# Patient Record
Sex: FEMALE | Race: WHITE | NOT HISPANIC OR LATINO | Employment: FULL TIME | URBAN - METROPOLITAN AREA
[De-identification: names, ages, dates, MRNs, and addresses within clinical notes are randomized per-mention and may not be internally consistent; named-entity substitution may affect disease eponyms.]

---

## 2017-05-17 ENCOUNTER — COMMUNICATION - HEALTHEAST (OUTPATIENT)
Dept: FAMILY MEDICINE | Facility: CLINIC | Age: 47
End: 2017-05-17

## 2017-05-17 ENCOUNTER — AMBULATORY - HEALTHEAST (OUTPATIENT)
Dept: FAMILY MEDICINE | Facility: CLINIC | Age: 47
End: 2017-05-17

## 2017-05-17 DIAGNOSIS — I47.20 V TACH (H): ICD-10-CM

## 2017-06-15 ENCOUNTER — COMMUNICATION - HEALTHEAST (OUTPATIENT)
Dept: FAMILY MEDICINE | Facility: CLINIC | Age: 47
End: 2017-06-15

## 2017-06-20 ENCOUNTER — OFFICE VISIT - HEALTHEAST (OUTPATIENT)
Dept: FAMILY MEDICINE | Facility: CLINIC | Age: 47
End: 2017-06-20

## 2017-06-20 DIAGNOSIS — R05.9 COUGH: ICD-10-CM

## 2017-06-20 RX ORDER — ALBUTEROL SULFATE 90 UG/1
1 AEROSOL, METERED RESPIRATORY (INHALATION) EVERY 4 HOURS PRN
Qty: 1 EACH | Refills: 0 | Status: SHIPPED | OUTPATIENT
Start: 2017-06-20 | End: 2022-04-11

## 2017-07-03 ENCOUNTER — COMMUNICATION - HEALTHEAST (OUTPATIENT)
Dept: CARDIOLOGY | Facility: CLINIC | Age: 47
End: 2017-07-03

## 2017-07-03 DIAGNOSIS — I49.3 PVC (PREMATURE VENTRICULAR CONTRACTION): ICD-10-CM

## 2017-07-10 ENCOUNTER — OFFICE VISIT - HEALTHEAST (OUTPATIENT)
Dept: CARDIOLOGY | Facility: CLINIC | Age: 47
End: 2017-07-10

## 2017-07-10 DIAGNOSIS — I49.3 PVC (PREMATURE VENTRICULAR CONTRACTION): ICD-10-CM

## 2017-07-10 DIAGNOSIS — I47.29 PAROXYSMAL VENTRICULAR TACHYCARDIA (H): ICD-10-CM

## 2017-07-10 ASSESSMENT — MIFFLIN-ST. JEOR: SCORE: 1597.92

## 2017-07-26 ENCOUNTER — HOSPITAL ENCOUNTER (OUTPATIENT)
Dept: CARDIOLOGY | Facility: CLINIC | Age: 47
Discharge: HOME | End: 2017-07-26
Attending: INTERNAL MEDICINE

## 2017-09-18 ENCOUNTER — COMMUNICATION - HEALTHEAST (OUTPATIENT)
Dept: CARDIOLOGY | Facility: CLINIC | Age: 47
End: 2017-09-18

## 2017-09-18 DIAGNOSIS — I49.3 PVC (PREMATURE VENTRICULAR CONTRACTION): ICD-10-CM

## 2018-03-02 ENCOUNTER — COMMUNICATION - HEALTHEAST (OUTPATIENT)
Dept: CARDIOLOGY | Facility: CLINIC | Age: 48
End: 2018-03-02

## 2018-03-02 DIAGNOSIS — I49.3 PVC (PREMATURE VENTRICULAR CONTRACTION): ICD-10-CM

## 2018-06-05 ENCOUNTER — OFFICE VISIT - HEALTHEAST (OUTPATIENT)
Dept: FAMILY MEDICINE | Facility: CLINIC | Age: 48
End: 2018-06-05

## 2018-06-05 DIAGNOSIS — Z12.31 VISIT FOR SCREENING MAMMOGRAM: ICD-10-CM

## 2018-06-05 DIAGNOSIS — L91.8 CUTANEOUS SKIN TAGS: ICD-10-CM

## 2018-06-05 DIAGNOSIS — I47.29 PAROXYSMAL VENTRICULAR TACHYCARDIA (H): ICD-10-CM

## 2018-06-05 ASSESSMENT — MIFFLIN-ST. JEOR: SCORE: 1602.45

## 2018-07-03 ENCOUNTER — COMMUNICATION - HEALTHEAST (OUTPATIENT)
Dept: ADMINISTRATIVE | Facility: CLINIC | Age: 48
End: 2018-07-03

## 2018-07-23 ENCOUNTER — COMMUNICATION - HEALTHEAST (OUTPATIENT)
Dept: TELEHEALTH | Facility: CLINIC | Age: 48
End: 2018-07-23

## 2018-07-23 ENCOUNTER — COMMUNICATION - HEALTHEAST (OUTPATIENT)
Dept: FAMILY MEDICINE | Facility: CLINIC | Age: 48
End: 2018-07-23

## 2018-07-23 ENCOUNTER — HOSPITAL ENCOUNTER (OUTPATIENT)
Dept: MAMMOGRAPHY | Facility: CLINIC | Age: 48
Discharge: HOME | End: 2018-07-23
Attending: FAMILY MEDICINE

## 2018-07-23 DIAGNOSIS — Z12.31 VISIT FOR SCREENING MAMMOGRAM: ICD-10-CM

## 2018-08-25 ENCOUNTER — COMMUNICATION - HEALTHEAST (OUTPATIENT)
Dept: CARDIOLOGY | Facility: CLINIC | Age: 48
End: 2018-08-25

## 2018-08-25 DIAGNOSIS — I49.3 PVC (PREMATURE VENTRICULAR CONTRACTION): ICD-10-CM

## 2018-08-27 ENCOUNTER — OFFICE VISIT - HEALTHEAST (OUTPATIENT)
Dept: CARDIOLOGY | Facility: CLINIC | Age: 48
End: 2018-08-27

## 2018-08-27 DIAGNOSIS — I49.3 PVC (PREMATURE VENTRICULAR CONTRACTION): ICD-10-CM

## 2018-08-27 DIAGNOSIS — I47.29 PAROXYSMAL VENTRICULAR TACHYCARDIA (H): ICD-10-CM

## 2019-01-29 ENCOUNTER — COMMUNICATION - HEALTHEAST (OUTPATIENT)
Dept: CARDIOLOGY | Facility: CLINIC | Age: 49
End: 2019-01-29

## 2019-01-29 DIAGNOSIS — I49.3 PVC (PREMATURE VENTRICULAR CONTRACTION): ICD-10-CM

## 2019-05-28 ENCOUNTER — COMMUNICATION - HEALTHEAST (OUTPATIENT)
Dept: TELEHEALTH | Facility: CLINIC | Age: 49
End: 2019-05-28

## 2019-05-28 ENCOUNTER — OFFICE VISIT - HEALTHEAST (OUTPATIENT)
Dept: FAMILY MEDICINE | Facility: CLINIC | Age: 49
End: 2019-05-28

## 2019-05-28 ENCOUNTER — COMMUNICATION - HEALTHEAST (OUTPATIENT)
Dept: FAMILY MEDICINE | Facility: CLINIC | Age: 49
End: 2019-05-28

## 2019-05-28 DIAGNOSIS — R05.9 COUGH: ICD-10-CM

## 2019-05-28 RX ORDER — CODEINE PHOSPHATE AND GUAIFENESIN 10; 100 MG/5ML; MG/5ML
10 SOLUTION ORAL 4 TIMES DAILY PRN
Qty: 240 ML | Refills: 0 | Status: SHIPPED | OUTPATIENT
Start: 2019-05-28 | End: 2022-04-11

## 2019-10-07 ENCOUNTER — COMMUNICATION - HEALTHEAST (OUTPATIENT)
Dept: CARDIOLOGY | Facility: CLINIC | Age: 49
End: 2019-10-07

## 2019-10-07 DIAGNOSIS — I49.3 PVC (PREMATURE VENTRICULAR CONTRACTION): ICD-10-CM

## 2019-10-21 ENCOUNTER — COMMUNICATION - HEALTHEAST (OUTPATIENT)
Dept: FAMILY MEDICINE | Facility: CLINIC | Age: 49
End: 2019-10-21

## 2019-10-22 ENCOUNTER — AMBULATORY - HEALTHEAST (OUTPATIENT)
Dept: FAMILY MEDICINE | Facility: CLINIC | Age: 49
End: 2019-10-22

## 2019-10-22 DIAGNOSIS — I49.3 PVC (PREMATURE VENTRICULAR CONTRACTION): ICD-10-CM

## 2019-10-22 DIAGNOSIS — I47.29 PAROXYSMAL VENTRICULAR TACHYCARDIA (H): ICD-10-CM

## 2019-10-23 ENCOUNTER — OFFICE VISIT - HEALTHEAST (OUTPATIENT)
Dept: CARDIOLOGY | Facility: CLINIC | Age: 49
End: 2019-10-23

## 2019-10-23 DIAGNOSIS — I49.3 PVC (PREMATURE VENTRICULAR CONTRACTION): ICD-10-CM

## 2019-10-23 DIAGNOSIS — I47.29 PAROXYSMAL VENTRICULAR TACHYCARDIA (H): ICD-10-CM

## 2019-10-23 ASSESSMENT — MIFFLIN-ST. JEOR: SCORE: 1632.32

## 2020-01-03 ENCOUNTER — COMMUNICATION - HEALTHEAST (OUTPATIENT)
Dept: CARDIOLOGY | Facility: CLINIC | Age: 50
End: 2020-01-03

## 2020-01-03 DIAGNOSIS — I49.3 PVC (PREMATURE VENTRICULAR CONTRACTION): ICD-10-CM

## 2020-01-28 ENCOUNTER — COMMUNICATION - HEALTHEAST (OUTPATIENT)
Dept: FAMILY MEDICINE | Facility: CLINIC | Age: 50
End: 2020-01-28

## 2020-09-29 ENCOUNTER — COMMUNICATION - HEALTHEAST (OUTPATIENT)
Dept: FAMILY MEDICINE | Facility: CLINIC | Age: 50
End: 2020-09-29

## 2020-09-29 ENCOUNTER — AMBULATORY - HEALTHEAST (OUTPATIENT)
Dept: FAMILY MEDICINE | Facility: CLINIC | Age: 50
End: 2020-09-29

## 2020-09-29 DIAGNOSIS — I49.3 PVC (PREMATURE VENTRICULAR CONTRACTION): ICD-10-CM

## 2020-09-29 DIAGNOSIS — I47.29 PAROXYSMAL VENTRICULAR TACHYCARDIA (H): ICD-10-CM

## 2020-10-27 ENCOUNTER — COMMUNICATION - HEALTHEAST (OUTPATIENT)
Dept: CARDIOLOGY | Facility: CLINIC | Age: 50
End: 2020-10-27

## 2020-10-29 ENCOUNTER — OFFICE VISIT - HEALTHEAST (OUTPATIENT)
Dept: CARDIOLOGY | Facility: CLINIC | Age: 50
End: 2020-10-29

## 2020-10-29 DIAGNOSIS — R00.1 SINUS BRADYCARDIA: ICD-10-CM

## 2020-10-29 DIAGNOSIS — I47.29 PAROXYSMAL VENTRICULAR TACHYCARDIA (H): ICD-10-CM

## 2020-10-29 DIAGNOSIS — I49.3 PVC (PREMATURE VENTRICULAR CONTRACTION): ICD-10-CM

## 2020-10-29 LAB
ALBUMIN SERPL-MCNC: 4.3 G/DL (ref 3.5–5)
ALP SERPL-CCNC: 58 U/L (ref 45–120)
ALT SERPL W P-5'-P-CCNC: 20 U/L (ref 0–45)
ANION GAP SERPL CALCULATED.3IONS-SCNC: 13 MMOL/L (ref 5–18)
AST SERPL W P-5'-P-CCNC: 17 U/L (ref 0–40)
BILIRUB SERPL-MCNC: 0.8 MG/DL (ref 0–1)
BUN SERPL-MCNC: 13 MG/DL (ref 8–22)
CALCIUM SERPL-MCNC: 10 MG/DL (ref 8.5–10.5)
CHLORIDE BLD-SCNC: 104 MMOL/L (ref 98–107)
CO2 SERPL-SCNC: 24 MMOL/L (ref 22–31)
CREAT SERPL-MCNC: 0.67 MG/DL (ref 0.6–1.1)
ERYTHROCYTE [DISTWIDTH] IN BLOOD BY AUTOMATED COUNT: 12.8 % (ref 11–14.5)
GFR SERPL CREATININE-BSD FRML MDRD: >60 ML/MIN/1.73M2
GLUCOSE BLD-MCNC: 72 MG/DL (ref 70–125)
HCT VFR BLD AUTO: 47 % (ref 35–47)
HGB BLD-MCNC: 15.2 G/DL (ref 12–16)
MCH RBC QN AUTO: 28.9 PG (ref 27–34)
MCHC RBC AUTO-ENTMCNC: 32.3 G/DL (ref 32–36)
MCV RBC AUTO: 89 FL (ref 80–100)
PLATELET # BLD AUTO: 346 THOU/UL (ref 140–440)
PMV BLD AUTO: 11.2 FL (ref 8.5–12.5)
POTASSIUM BLD-SCNC: 4.6 MMOL/L (ref 3.5–5)
PROT SERPL-MCNC: 7.7 G/DL (ref 6–8)
RBC # BLD AUTO: 5.26 MILL/UL (ref 3.8–5.4)
SODIUM SERPL-SCNC: 141 MMOL/L (ref 136–145)
TSH SERPL DL<=0.005 MIU/L-ACNC: 1.09 UIU/ML (ref 0.3–5)
WBC: 8.5 THOU/UL (ref 4–11)

## 2020-10-29 RX ORDER — ERGOCALCIFEROL 1.25 MG/1
50000 CAPSULE ORAL
Status: SHIPPED | COMMUNITY
Start: 2020-10-29 | End: 2022-04-11

## 2020-10-29 RX ORDER — ASCORBIC ACID 500 MG
500 TABLET ORAL DAILY PRN
Status: SHIPPED | COMMUNITY
Start: 2020-10-29 | End: 2022-04-11

## 2020-10-29 ASSESSMENT — MIFFLIN-ST. JEOR: SCORE: 1368.85

## 2020-10-30 ENCOUNTER — COMMUNICATION - HEALTHEAST (OUTPATIENT)
Dept: CARDIOLOGY | Facility: CLINIC | Age: 50
End: 2020-10-30

## 2020-10-30 DIAGNOSIS — I49.3 PVC (PREMATURE VENTRICULAR CONTRACTION): ICD-10-CM

## 2020-10-30 RX ORDER — METOPROLOL TARTRATE 25 MG/1
25 TABLET, FILM COATED ORAL 2 TIMES DAILY
Qty: 180 TABLET | Refills: 3 | Status: SHIPPED | OUTPATIENT
Start: 2020-10-30 | End: 2021-11-15

## 2021-05-29 NOTE — PROGRESS NOTES
ASSESSMENT:  1. Cough  Chest x-ray negative for pneumonia, suspect viral bronchitis.  - XR Chest 2 Views  - codeine-guaiFENesin (GUAIFENESIN AC)  mg/5 mL liquid; Take 10 mL by mouth 4 (four) times a day as needed.  Dispense: 240 mL; Refill: 0        PLAN:  1.  Two-view chest x-ray discussed with the patient.  2.  Symptomatic treatment with codeine cough syrup, continue other symptomatic treatment.  3.  Follow-up as needed.       Orders Placed This Encounter   Procedures     XR Chest 2 Views     Order Specific Question:   Reason for Exam (Describe Symptoms):     Answer:   Cough for 1 week.     Order Specific Question:   Can the procedure be changed per Radiologist protocol?     Answer:   Yes     Order Specific Question:   Is the patient pregnant?     Answer:   No     There are no discontinued medications.    Return in about 2 weeks (around 6/11/2019) for Return to clinic if symptoms persist or worsen.    CHIEF COMPLAINT:  Chief Complaint   Patient presents with     Cough     1 week that seems to be getting worse        SUBJECTIVE:  An is a 49 y.o. female who presents with a cough which began a week ago. Patient explains that she initially had a sore throat which resolved after a day then she developed a cough and some wheezing. She has been experiencing some shortness of breath when going up the stairs. She also has a mild runny nose. She notes that her cough keeps her up at night. She has tried several OTC cough medications which have provided minimal relief. She has a history of exercise induced asthma but has not used an inhaler in years. She tried using her daughters inhaler last night which provided some relief. Her daughter was sick before her but is now feeling better.    REVIEW OF SYSTEMS:   Patient complains of a cough.  All other systems are negative.    PFSH:  Immunization History   Administered Date(s) Administered     Hep A, historic 04/06/2011, 12/10/2012     Hep B, Adult 10/01/1992,  10/30/1992, 03/19/1993     Hep B, historic 10/01/1992, 10/30/1992, 03/19/1993     Influenza, Seasonal, Inj PF IIV3 10/17/2013     Influenza, inj, historic,unspecified 11/22/2005, 11/01/2007, 12/03/2008, 12/10/2012, 10/03/2014, 10/14/2015     Influenza,seasonal quad, PF, 36+MOS 10/28/2016, 10/16/2017     Influenza,seasonal, Inj IIV3 11/22/2005, 11/01/2007, 12/03/2008     Pneumo Polysac 23-V 01/01/1993     Td, Adult, Absorbed 01/01/1996, 04/22/2003     Td,adult,historic,unspecified 06/02/2011     Tdap 01/01/1996, 04/22/2003     Social History     Socioeconomic History     Marital status:      Spouse name: Not on file     Number of children: Not on file     Years of education: Not on file     Highest education level: Not on file   Occupational History     Not on file   Social Needs     Financial resource strain: Not on file     Food insecurity:     Worry: Not on file     Inability: Not on file     Transportation needs:     Medical: Not on file     Non-medical: Not on file   Tobacco Use     Smoking status: Never Smoker     Smokeless tobacco: Never Used   Substance and Sexual Activity     Alcohol use: Not on file     Drug use: Not on file     Sexual activity: Not on file   Lifestyle     Physical activity:     Days per week: Not on file     Minutes per session: Not on file     Stress: Not on file   Relationships     Social connections:     Talks on phone: Not on file     Gets together: Not on file     Attends [employer] service: Not on file     Active member of club or organization: Not on file     Attends meetings of clubs or organizations: Not on file     Relationship status: Not on file     Intimate partner violence:     Fear of current or ex partner: Not on file     Emotionally abused: Not on file     Physically abused: Not on file     Forced sexual activity: Not on file   Other Topics Concern     Not on file   Social History Narrative    Diet-        Exercise-     History reviewed. No pertinent past medical  history.  Family History   Problem Relation Age of Onset     Breast cancer Maternal Grandmother 80       MEDICATIONS:  Current Outpatient Medications   Medication Sig Dispense Refill     metoprolol tartrate (LOPRESSOR) 50 MG tablet TAKE 1 TABLET BY MOUTH TWICE DAILY 180 tablet 2     albuterol (PROAIR HFA;PROVENTIL HFA;VENTOLIN HFA) 90 mcg/actuation inhaler Inhale 1 puff every 4 (four) hours as needed for wheezing or shortness of breath (shortness of breath). 1 each 0     codeine-guaiFENesin (GUAIFENESIN AC)  mg/5 mL liquid Take 10 mL by mouth 4 (four) times a day as needed. 240 mL 0     No current facility-administered medications for this visit.        TOBACCO USE:  Social History     Tobacco Use   Smoking Status Never Smoker   Smokeless Tobacco Never Used       VITALS:  Vitals:    05/28/19 1047   BP: 143/86   Pulse: 77   Temp: 98.4  F (36.9  C)   TempSrc: Oral   SpO2: 98%   Weight: (!) 233 lb 12.8 oz (106.1 kg)     Wt Readings from Last 3 Encounters:   05/28/19 (!) 233 lb 12.8 oz (106.1 kg)   08/27/18 (!) 233 lb (105.7 kg)   06/05/18 (!) 235 lb (106.6 kg)       PHYSICAL EXAM:  Constitutional:   Reveals a healthy appearing female.  Vitals: per nursing notes.  HEENT:  Ears:  External canals, TMs clear.    Eyes:  EOMs full, PERRL.  Lungs: Clear to A&P without rales or wheezes.  Respiratory effort normal.  Cardiac:   Regular rate and rhythm, normal S1, S2, no murmur or gallop.  Musculoskeletal: No peripheral swelling.  Neuro:  Alert and oriented. Cranial nerves, motor, sensory exams are intact.  No gross focal deficits.  Psychiatric:  Memory intact, mood appropriate.    QUALITY MEASURES:      DATA REVIEWED:  Additional History from Old Records Summarized (2):   Decision to Obtain Records (1):   Radiology Tests Summarized or Ordered (1): Ordered CXR.  Labs Reviewed or Ordered (1):   Medicine Test Summarized or Ordered (1):   Independent Review of EKG, X-RAY, or RAPID STREP (2 each): Reviewed CXR: Preliminary  interpretation is negative for pneumonia.     The visit lasted a total of 12 minutes face to face with the patient. Over 50% of the time was spent counseling and educating the patient about her cough.    By signing my name below, I, Deon Vincent, attest that this documentation has been prepared under the direction and in the presence of Dr. Zeferino Acosta.  Electronic Signature: José Miguel Alegria. 5/28/2019 10:49 AM.    I, Dr. Acosta, personally performed the services described in this documentation. All medical record entries made by the scribe were at my direction and in my presence. I have reviewed the chart and discharge instructions (if applicable) and agree that the record reflects my personal performance and is accurate and complete.      Total data points: 3

## 2021-05-31 VITALS — BODY MASS INDEX: 45.94 KG/M2 | HEIGHT: 60 IN | WEIGHT: 234 LBS

## 2021-05-31 VITALS — BODY MASS INDEX: 45.88 KG/M2 | WEIGHT: 234.9 LBS

## 2021-06-01 VITALS — BODY MASS INDEX: 45.5 KG/M2 | WEIGHT: 233 LBS

## 2021-06-01 VITALS — BODY MASS INDEX: 46.13 KG/M2 | HEIGHT: 60 IN | WEIGHT: 235 LBS

## 2021-06-02 VITALS — WEIGHT: 233.8 LBS | BODY MASS INDEX: 45.66 KG/M2

## 2021-06-02 NOTE — PROGRESS NOTES
[facility] Heart Care Note  Assessment:  1. History of repetitive mono-formic ventricular tachycardia, felt to originate from the right ventricular outflow tract.    2. Rhythm seems very well-controlled on beta-blockers.  Seems to do better on metoprolol tartrate       Episodes of bradycardia with metoprolol succinate  3. No structural heart disease with previous comprehensive evaluation, including stress nuclear scan, CT heart scan and echocardiogram.   Normal echocardiogram 5 May 2016      Plan:    You have done very well over the past year having almost no palpitations or awareness of arrhythmias  Continue metoprolol 50 mg twice daily  Watch blood pressure-you did have some elevated blood pressures today but repeat blood pressure was satisfactory  Remain active  I do not recommend any further cardiac studies at this time  You are due for a complete physical exam should check with your primary physician  When I reviewed the chart I see that you have not had any blood testing for quite some time and I do in that regard as well  Return in 1 year or sooner as need arises          Subjective:    I had the opportunity to see.An WHIPPLE Raul , who is a 49 y.o. female with a known history of ventricular tachycardia  She seems to do very well while taking metoprolol 50 mg twice daily.  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 Manuel at [facility] in the near future  Otherwise, An has been doing well over the past year with no other medical issues  She tries to stay active  Has  been unsuccessful  in weight management or weight loss        Problem List:  Patient Active Problem List   Diagnosis     Ventricular Tachycardia     PVC (premature ventricular contraction)     Medical History:  No past medical history on file.  Surgical History:  Past Surgical History:   Procedure Laterality Date      SECTION      Description:  Section;  Recorded: 2009;  Comments: failure to progress     Social History:  Social History     Socioeconomic History     Marital status:      Spouse name: Not on file     Number of children: Not on file     Years of education: Not on file     Highest education level: Not on file   Occupational History     Not on file   Social Needs     Financial resource strain: Not on file     Food insecurity:     Worry: Not on file     Inability: Not on file     Transportation needs:     Medical: Not on file     Non-medical: Not on file   Tobacco Use     Smoking status: Never Smoker     Smokeless tobacco: Never Used   Substance and Sexual Activity     Alcohol use: Not on file     Drug use: Not on file     Sexual activity: Not on file   Lifestyle     Physical activity:     Days per week: Not on file     Minutes per session: Not on file     Stress: Not on file   Relationships     Social connections:     Talks on phone: Not on file     Gets together: Not on file     Attends Jehovah's witness service: Not on file     Active member of club or organization: Not on file     Attends meetings of clubs or organizations: Not on file     Relationship status: Not on file     Intimate partner violence:     Fear of current or ex partner: Not on file     Emotionally abused: Not on file     Physically abused: Not on file     Forced sexual activity: Not on file   Other Topics Concern     Not on file   Social History Narrative    Diet-        Exercise-       Review of Systems:      General: WNL  Eyes: WNL  Ears/Nose/Throat: WNL  Lungs: WNL  Heart: WNL  Stomach: WNL  Bladder: WNL  Muscle/Joints: WNL  Skin:  "WNL  Nervous System: WNL  Mental Health: WNL     Blood: WNL        Family History:  Family History   Problem Relation Age of Onset     Breast cancer Maternal Grandmother 80         Allergies:  Allergies   Allergen Reactions     Amoxicillin Nausea And Vomiting     Metoprolol Succinate Other (See Comments)     Pt felt bad, bradycardic and sick with the long acting metoprolol.     Mold Other (See Comments)     Sinus infections, runny nose.     Erythromycin Nausea And Vomiting       Medications:  Current Outpatient Medications   Medication Sig Dispense Refill     metoprolol tartrate (LOPRESSOR) 50 MG tablet TAKE 1 TABLET BY MOUTH TWICE DAILY 180 tablet 0     albuterol (PROAIR HFA;PROVENTIL HFA;VENTOLIN HFA) 90 mcg/actuation inhaler Inhale 1 puff every 4 (four) hours as needed for wheezing or shortness of breath (shortness of breath). 1 each 0     codeine-guaiFENesin (GUAIFENESIN AC)  mg/5 mL liquid Take 10 mL by mouth 4 (four) times a day as needed. 240 mL 0     No current facility-administered medications for this visit.          Objective:   Vital signs:  /76 (Patient Site: Left Arm, Patient Position: Sitting, Cuff Size: Adult Large)   Pulse 68   Resp 16   Ht 5' 1.02\" (1.55 m)   Wt (!) 238 lb (108 kg)   BMI 44.93 kg/m        Physical Exam:  Alert pleasant articulate in no acute distress    GENERAL APPEARANCE: Alert, cooperative and in no acute distress.  HEENT: No scleral icterus. No Xanthelasma. Oral mucous membranes pink and moist.  NECK: JVP  Difficult to assesscm. No Hepatojugular reflux. Thyroid not  Palpable  CHEST: clear to auscultation and percussion  CARDIOVASCULAR: S1, S2 without murmur    Brachial, radial  pulses are intact and symmetric.   No carotid bruits noted.  ABDOMEN: Non tender. BS+. No bruits.  EXTREMITIES: No cyanosis, clubbing or edema.    Lab Results:  LIPIDS:  Lab Results   Component Value Date    CHOL 169 10/31/2011     Lab Results   Component Value Date    HDL 30 (L) " 10/31/2011     Lab Results   Component Value Date    LDLCALC 89.6 10/31/2011     Lab Results   Component Value Date    TRIG 247 (H) 10/31/2011     No components found for: CHOLHDL    BMP:  Lab Results   Component Value Date    CREATININE 0.7 10/31/2011    BUN 10 10/31/2011     10/31/2011    K 4.3 10/31/2011     10/31/2011    CO2 24 10/31/2011         This note has been dictated using voice recognition software. Any grammatical or context distortions are unintentional and inherent to the software.  Luis Garnica MD  [facility] HEART [facility]  [phone]

## 2021-06-02 NOTE — PATIENT INSTRUCTIONS - HE
An Carter    Thank you for coming to the [facility] today for a cardiac evaluation  It was my pleasure to see you today  A good contact for any questions would be Whitley Franklin  RN @ [phone]    You have done very well over the past year having almost no palpitations or awareness of arrhythmias  Continue metoprolol 50 mg twice daily  Watch blood pressure-you did have some elevated blood pressures today but repeat blood pressure was satisfactory  Remain active  I do not recommend any further cardiac studies at this time  You are due for a complete physical exam should check with your primary physician  When I reviewed the chart I see that you have not had any blood testing for quite some time and I do in that regard as well  Return in 1 year or sooner as need arises

## 2021-06-02 NOTE — TELEPHONE ENCOUNTER
Referral Request  Type of referral: Heart care   Who s requesting: Patient  Why the request: Scheduled for F/U and needs referral for insurance reasons  Have you been seen for this request: Yes  Does patient have a preference on a group/provider? Dr. Danika CARRINGTON [facility]  Okay to leave a detailed message?  Yes

## 2021-06-03 VITALS
HEART RATE: 68 BPM | BODY MASS INDEX: 44.93 KG/M2 | WEIGHT: 238 LBS | HEIGHT: 61 IN | DIASTOLIC BLOOD PRESSURE: 76 MMHG | SYSTOLIC BLOOD PRESSURE: 128 MMHG | RESPIRATION RATE: 16 BRPM

## 2021-06-11 NOTE — PROGRESS NOTES
[facility] Heart Care Note    IMPRESSION:    1. History of repetitive mono-formic ventricular tachycardia, felt to originate from the right ventricular outflow tract.    2. Rhythm seems very well-controlled on beta-blockers.  Seems to do better on metoprolol tartrate       Episodes of bradycardia with metoprolol succinate  3. No structural heart disease with previous comprehensive evaluation, including stress nuclear scan, CT heart scan and echocardiogram.   Normal echocardiogram 5 May 2016      Plan:  You seem to be doing much better with your job change and reduced stress levels  Continue metoprolol tartrate 50 mg twice daily  Obtain 24 hour Holter monitor to assess for ventricular ectopy and bradycardia  We  discussed the possibility of EP study and PVC ablation; with current monitoring system and with expertise of our physicians this has been quite successful  Anticipate seeing you in 1 year, return sooner and if any problems arise    Subjective:    An is changed her job.  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; she finds this job to be much less stressful  In her overall outlook has improved considerably  When she took metoprolol succinate 100 mg daily she had episodes of bradycardia felt her heart rate was 30 bpm  She went back to metoprolol tartrate 50 mg twice daily and has had no apparent bradycardia  She has very rare ectopy perhaps every month she will note some palpitations but if she misses a dose of metoprolol does seem that her heart rate can be come on even  She has no chest pain  Denies orthopnea PND or pedal edema  No syncope or near syncope  She has been quite active    We discussed the upgrades and advances in VT and PVC ablation and this would be an option and would allow stopping metoprolol.  We also discussed all metoprolol is a very safe medicine and would not cause organ toxicity and can be  taken for extended duration          Problem List:  Patient Active Problem List   Diagnosis     Pain In The Leg (Below The Knee)     Dyshidrotic Eczema     Ventricular Tachycardia     Vitamin D Deficiency     PVC (premature ventricular contraction)     Medical History:  No past medical history on file.  Surgical History:  Past Surgical History:   Procedure Laterality Date     PA  DELIVERY ONLY      Description:  Section;  Recorded: 2009;  Comments: failure to progress     Social History:  Social History     Social History     Marital status:      Spouse name: N/A     Number of children: N/A     Years of education: N/A     Occupational History     Not on file.     Social History Main Topics     Smoking status: Never Smoker     Smokeless tobacco: Never Used     Alcohol use Not on file     Drug use: Not on file     Sexual activity: Not on file     Other Topics Concern     Not on file     Social History Narrative       Review of Systems:        General: WNL  Eyes: WNL  Ears/Nose/Throat: WNL  Lungs: WNL  Heart: WNL  Stomach: WNL  Bladder: WNL  Muscle/Joints: WNL  Skin: WNL  Nervous System: WNL  Mental Health: WNL  Hormones: Negative  Blood: WNL                                            Family History:  Family History   Problem Relation Age of Onset     Breast cancer Maternal Grandmother 80         Allergies:  Allergies   Allergen Reactions     Amoxicillin Nausea And Vomiting     Metoprolol Succinate Other (See Comments)     Pt felt bad, bradycardic and sick with the long acting metoprolol.     Mold Other (See Comments)     Sinus infections, runny nose.       Medications:  Current Outpatient Prescriptions   Medication Sig Dispense Refill     metoprolol tartrate (LOPRESSOR) 50 MG tablet TAKE 1 TABLET BY MOUTH TWICE DAILY 180 tablet 0     albuterol (PROAIR HFA;PROVENTIL HFA;VENTOLIN HFA) 90 mcg/actuation inhaler Inhale 1 puff every 4 (four) hours as needed for wheezing or shortness of breath  (shortness of breath). 1 each 0     benzonatate (TESSALON PERLES) 100 MG capsule Take 1 capsule (100 mg total) by mouth 3 (three) times a day as needed for cough. 30 capsule 0     No current facility-administered medications for this visit.        Objective:   Vital signs:  /68 (Patient Site: Right Arm, Patient Position: Sitting, Cuff Size: Adult Large)  Resp 16  Ht 5' (1.524 m)  Wt (!) 234 lb (106.1 kg)  LMP 06/10/2017 (Approximate)  BMI 45.7 kg/m2      Physical Exam:  Patient is overweight at 234 pounds  Pressure 120/70 sitting, 128 standing  Heart rate is 70 and regular with no ectopics    GENERAL APPEARANCE: Alert, cooperative and in no acute distress.  HEENT: No scleral icterus. No Xanthelasma. Oral mucous membranes pink and moist.  NECK: JVP  Normal cm. No Hepatojugular reflux. Thyroid not  Palpable  CHEST: clear to auscultation and percussion  CARDIOVASCULAR: S1, S2 without murmur    Brachial, radial  pulses are intact and symmetric.   No carotid bruits noted.  ABDOMEN: Non tender. BS+. No bruits.  EXTREMITIES: No cyanosis, clubbing or edema.    Lab Results:  LIPIDS:  Lab Results   Component Value Date    CHOL 169 10/31/2011     Lab Results   Component Value Date    HDL 30 (L) 10/31/2011     Lab Results   Component Value Date    LDLCALC 89.6 10/31/2011     Lab Results   Component Value Date    TRIG 247 (H) 10/31/2011     No components found for: CHOLHDL    BMP:  Lab Results   Component Value Date    CREATININE 0.7 10/31/2011    BUN 10 10/31/2011     10/31/2011    K 4.3 10/31/2011     10/31/2011    CO2 24 10/31/2011         This note has been dictated using voice recognition software. Any grammatical or context distortions are unintentional and inherent to the software.  Luis Garnica MD  [facility] HEART [facility]  [phone]

## 2021-06-11 NOTE — PROGRESS NOTES
Assessment/Plan:        Diagnoses and all orders for this visit:    Cough        We will start her on doxycycline, Tessalon Perles.  We will also provide her with albuterol inhaler if the Tessalon Perles does not help with her coughing fits.  Fluid hydration, precautionary measures.  Probiotics or increase yogurt intake with the antibiotic.  Sunscreen protection with her upcoming trip, limit sun exposure.  Recheck in 1 and half weeks when she comes back from her trip if symptoms persistent.  Earlier evaluation if worse.  She was agreeable with the plans.  Subjective:    Patient ID: An Carter is a 47 y.o. female.    HPI    An is here with concerns about cough.  Symptoms has been ongoing for 13 days now.  She started with a sore throat, pain in her teeth, face.  She then developed cough the past 10 days.  Cough with sometimes be an fits to the point of vomiting, gagging.  Denies any hemoptysis.  No shortness of breath.  Headaches and sternal pain with coughing.  [place] [facility] [place] [facility] [employer]  Is not in direct contact with patients.  She denies any recent travel but will be traveling this weekend.  Denies any issues with smoking.  History of exercise-induced asthma and the last episode was almost 15 years ago.  She has been taking Tylenol, Advil.  She also tried Robitussin-DM, Mucinex DM.  Congestion and pain is better but the cough has been persistent and is slightly worse compared to before.    She has V. tach.  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  She is currently on metoprolol tartrate 50 mg twice a day.  They tried her on metoprolol succinate 100 mg daily but cause more bradycardia.  She has an upcoming follow-up with cardiology in July.  Visits them once a year.  She also has Lasix in place for intermittent leg swelling.  She has not been taking it as swelling has been minimal.    Review of Systems  As above otherwise negative.          Objective:     Physical Exam  /80 (Patient Site: Right Arm, Patient Position: Sitting, Cuff Size: Adult Large)  Pulse 89  Temp 98.3  F (36.8  C) (Oral)   Wt (!) 234 lb 14.4 oz (106.5 kg)  LMP 06/10/2017 (Approximate)  SpO2 98%  Breastfeeding? No  BMI 45.88 kg/m2    Vital signs noted above. AAO ×3.  HEENT no nasal discharge, moist oral mucosa. Ears:TMs intact, no fluid collection. Neck: Supple neck, nonpalpable cervical lymph nodes.  Lungs: Symmetrical chest expansion, no retractions.  Clear to auscultation bilateral.  Heart: S1-S2 regular rate and rhythm, no murmurs were noted.  Abdomen:  with bowel sounds.  Extremities: Minimal bipedal edema, pulses were full and equal.

## 2021-06-11 NOTE — TELEPHONE ENCOUNTER
Referral Request  Type of referral: Cardiology  Who s requesting: Patient  Why the request: Dysrhythmia  Have you been seen for this request: Yes  Does patient have a preference on a group/provider? Dr. Garnica @ [facility]  Okay to leave a detailed message?  Yes

## 2021-06-11 NOTE — TELEPHONE ENCOUNTER
Referral request, please advise.    Ins:  , Prior Authorization will need to be obtained prior to scheduling if required

## 2021-06-11 NOTE — TELEPHONE ENCOUNTER
I did place a referral for cardiology, but also she would be due for an office visit or a physical this fall.

## 2021-06-12 NOTE — TELEPHONE ENCOUNTER
Wellness Screening Tool  Symptom Screening:  Do you have one of the following NEW symptoms:    Fever (subjective or >100.0)?  No    A new cough?  No    Shortness of breath?  No     Chills? No     New loss of taste or smell? No     Generalized body aches? No     New persistent headache? No     New sore throat? No     Nausea, vomiting, or diarrhea?  No    Within the past 2 weeks, have you been exposed to someone with a known positive illness below:    COVID-19 (known or suspected)?  No    Chicken pox?  No    Mealses?  No    Pertussis?  No    Patient notified of visitor policy- They may have one person accompany them to their appointment, but they will need to wear a mask and will be screened upon arrival for symptoms: Yes  Pt informed to wear a mask: Yes  Pt notified if they develop any symptoms listed above, prior to their appointment, they are to call the clinic directly at [phone] for further instructions.  Yes  Patient's appointment status: 10/29

## 2021-06-12 NOTE — PROGRESS NOTES
[facility] Heart Care Note    Assessment:  1. History of repetitive mono-formic ventricular tachycardia, felt to originate from the right ventricular outflow tract.    2. Rhythm seems very well-controlled on beta-blockers.  Seems to do better on metoprolol tartrate       Episodes of bradycardia with metoprolol succinate  3. No structural heart disease with previous comprehensive evaluation, including stress nuclear scan, CT heart scan and echocardiogram.   Normal echocardiogram 5 May 2016    Plan:    You have lost 50 pounds on a conscientious weight loss program/ weight watchers  Continue  with your moderate exercise program  Rhythm seems well controlled-no apparent VT  Heart rate seems too slow so we should reduce the metoprolol tartrate to 25 mg twice daily  Blood work today will include a TSH, comprehensive metabolic panel and a CBC  Follow-up in 1 year or sooner as the need arises      Subjective:    I had the opportunity to see.An Caretr , who is a 50 y.o. female with a known history of VCs and ventricular tachycardia  While on metoprolol succinate her heart rate was too slow so she was switched to metoprolol tartrate 50 mg twice daily  Continues to notice slow heart rate sometimes in the 40s or 50s and sometimes she gets lightheaded upon standing  No awareness of palpitations no major arrhythmias no syncopal or near syncopal episodes  Exercise capacity is good not limited  Has lost 50 pounds on a conscientious diet uses weight watchers program she has a pretty realistic diet program  Currently works from home does not go into the hospital at all  Her daughter went off to college at  [employer]-did not find it be good experience lots of restrictions as of the Covid      Problem List:  Patient Active Problem List   Diagnosis     Ventricular Tachycardia     PVC (premature ventricular contraction)     Medical History:  No past medical history on file.  Surgical History:  Past Surgical History:   Procedure  Laterality Date      SECTION      Description:  Section;  Recorded: 2009;  Comments: failure to progress     Social History:  Social History     Socioeconomic History     Marital status:      Spouse name: Not on file     Number of children: Not on file     Years of education: Not on file     Highest education level: Not on file   Occupational History     Not on file   Social Needs     Financial resource strain: Not on file     Food insecurity     Worry: Not on file     Inability: Not on file     Transportation needs     Medical: Not on file     Non-medical: Not on file   Tobacco Use     Smoking status: Never Smoker     Smokeless tobacco: Never Used   Substance and Sexual Activity     Alcohol use: Not on file     Drug use: Not on file     Sexual activity: Not on file   Lifestyle     Physical activity     Days per week: Not on file     Minutes per session: Not on file     Stress: Not on file   Relationships     Social connections     Talks on phone: Not on file     Gets together: Not on file     Attends Restoration service: Not on file     Active member of club or organization: Not on file     Attends meetings of clubs or organizations: Not on file     Relationship status: Not on file     Intimate partner violence     Fear of current or ex partner: Not on file     Emotionally abused: Not on file     Physically abused: Not on file     Forced sexual activity: Not on file   Other Topics Concern     Not on file   Social History Narrative    Diet-        Exercise-       Review of Systems:      General: WNL  Eyes: WNL  Ears/Nose/Throat: WNL  Lungs: WNL  Heart: WNL  Stomach: WNL  Bladder: WNL  Muscle/Joints: WNL  Skin: WNL  Nervous System: WNL  Mental Health: WNL     Blood: WNL        Family History:  Family History   Problem Relation Age of Onset     Breast cancer Maternal Grandmother 80         Allergies:  Allergies   Allergen Reactions     Amoxicillin Nausea And Vomiting     Metoprolol Succinate  Other (See Comments)     Pt felt bad, bradycardic and sick with the long acting metoprolol.     Mold Other (See Comments)     Sinus infections, runny nose.     Erythromycin Nausea And Vomiting       Medications:  Current Outpatient Medications   Medication Sig Dispense Refill     albuterol (PROAIR HFA;PROVENTIL HFA;VENTOLIN HFA) 90 mcg/actuation inhaler Inhale 1 puff every 4 (four) hours as needed for wheezing or shortness of breath (shortness of breath). 1 each 0     apple cider vinegar 600 mg cap Take by mouth.       ascorbic acid, vitamin C, (ASCORBIC ACID WITH KATIE HIPS) 500 MG tablet Take 500 mg by mouth daily.       codeine-guaiFENesin (GUAIFENESIN AC)  mg/5 mL liquid Take 10 mL by mouth 4 (four) times a day as needed. 240 mL 0     ergocalciferol (VITAMIN D2) 1,250 mcg (50,000 unit) capsule Take 50,000 Units by mouth every 7 days. 1,000 units       metoprolol tartrate (LOPRESSOR) 50 MG tablet TAKE 1 TABLET BY MOUTH TWICE DAILY 180 tablet 3     multivit with minerals/lutein (MULTIVITAMIN 50 PLUS ORAL) Take by mouth.       No current facility-administered medications for this visit.       blood pressure 110/70 sitting, 120 standing  Heart rate 50 and regular without ectopy  Respiratory rate is nonlabored about 16  Patient seems afebrile   Vit      Physical Exam:  GENERAL APPEARANCE: Alert, cooperative and in no acute distress.  She has lost quite a bit of weight quite noticeable  HEENT: No scleral icterus. No Xanthelasma. Oral mucous membranes pink and moist.  NECK: JVP normal cm. No Hepatojugular reflux. Thyroid not  Palpable  CHEST: clear to auscultation and percussion  CARDIOVASCULAR: S1, S2 without murmur    Brachial, radial  pulses are intact and symmetric.   No carotid bruits noted.  ABDOMEN: Non tender. BS+. No bruits.  EXTREMITIES: No cyanosis, clubbing or edema.    Lab Results:  LIPIDS:  Lab Results   Component Value Date    CHOL 169 10/31/2011     Lab Results   Component Value Date    HDL 30 (L)  10/31/2011     Lab Results   Component Value Date    LDLCALC 89.6 10/31/2011     Lab Results   Component Value Date    TRIG 247 (H) 10/31/2011     No components found for: CHOLHDL    BMP:  Lab Results   Component Value Date    CREATININE 0.7 10/31/2011    BUN 10 10/31/2011     10/31/2011    K 4.3 10/31/2011     10/31/2011    CO2 24 10/31/2011         This note has been dictated using voice recognition software. Any grammatical or context distortions are unintentional and inherent to the software.  Luis Garnica MD  [facility] HEART [facility]  [phone]

## 2021-06-12 NOTE — PATIENT INSTRUCTIONS - HE
An Carter    Thank you for coming to the [facility] today for a cardiac evaluation  It was my pleasure to see you today  A good contact for any questions would be Whitley Franklin  RN @ [phone]    You have lost 50 pounds on a conscientious weight loss program/ weight watchers  Continue  with your moderate exercise program  Rhythm seems well controlled-no apparent VT  Heart rate seems too slow so we should reduce the metoprolol tartrate to 25 mg twice daily  Blood work today will include a TSH, comprehensive metabolic panel and a CBC  Follow-up in 1 year or sooner as the need arises

## 2021-06-16 PROBLEM — I49.3 PVC (PREMATURE VENTRICULAR CONTRACTION): Status: ACTIVE | Noted: 2017-07-10

## 2021-06-18 NOTE — PROGRESS NOTES
Procedures  Skin tags cleansed with topical alcohol  Removed using pick-up and Derma-blade  Bleeding controlled with gauze dressing  Covered with bandages

## 2021-06-18 NOTE — PROGRESS NOTES
ASSESSMENT:  1. Visit for screening mammogram  Patient is due for screening mammogram.  - Mammo Screening Bilateral; Future    2. Cutaneous skin tags  34 skin tags removed from the anterior neck.    3. Ventricular Tachycardia  Patient with a history of ventricular tachycardia followed by cardiology controlled with metoprolol.      PLAN:  1.  34 skin tags removed as described.  2.  Order for screening mammogram.  3.  Has yearly follow-up with cardiology.  4.  Patient will need to establish with a new primary and be seen for physical within the year.    No orders of the defined types were placed in this encounter.    There are no discontinued medications.    No Follow-up on file.    CHIEF COMPLAINT:  Chief Complaint   Patient presents with     Skin Problem     skin tag would like removed- [facility] , multiple skin tag on her neck -        SUBJECTIVE:  An is a 48 y.o. female presenting to the clinic today for skin tag removal.     Skin Tag: She has numerous skin tags in the face and neck area that she would like removed today. She notes that she has had several removed, most recently five or six years ago. Since then, many of them have grown back. They do not get caught on anything or cause her any real convenience, but would like the removed for aesthetic reasons.    Premature Ventricular Contractions: She is regularly followed by cardiology for a history of ventricular tachycardia and premature ventricular contractions. She takes a daily dose of metoprolol which has helped her maintain normal sinus rhythm. She notes that her cardiologist wanted to perform a procedure but she declined.     Health Maintenance: She is overdue for a mammogram.     REVIEW OF SYSTEMS:   All other systems are negative.    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  Immunization History   Administered Date(s) Administered     Hep A, historic 04/06/2011, 12/10/2012     Hep B, Adult 10/01/1992, 10/30/1992,  03/19/1993     Hep B, historic 10/01/1992, 10/30/1992, 03/19/1993     Influenza, Seasonal, Inj PF IIV3 10/17/2013     Influenza, inj, historic,unspecified 11/22/2005, 11/01/2007, 12/03/2008, 12/10/2012, 10/03/2014, 10/14/2015     Influenza,seasonal quad, PF, 36+MOS 10/28/2016, 10/16/2017     Influenza,seasonal, Inj IIV3 11/22/2005, 11/01/2007, 12/03/2008     Pneumo Polysac 23-V 01/01/1993     Td, Adult, Absorbed 01/01/1996, 04/22/2003     Td,adult,historic,unspecified 06/02/2011     Tdap 01/01/1996, 04/22/2003     Social History     Social History     Marital status:      Spouse name: N/A     Number of children: N/A     Years of education: N/A     Occupational History     Not on file.     Social History Main Topics     Smoking status: Never Smoker     Smokeless tobacco: Never Used     Alcohol use Not on file     Drug use: Not on file     Sexual activity: Not on file     Other Topics Concern     Not on file     Social History Narrative    Diet-        Exercise-     History reviewed. No pertinent past medical history.  Family History   Problem Relation Age of Onset     Breast cancer Maternal Grandmother 80       MEDICATIONS:  Current Outpatient Prescriptions   Medication Sig Dispense Refill     albuterol (PROAIR HFA;PROVENTIL HFA;VENTOLIN HFA) 90 mcg/actuation inhaler Inhale 1 puff every 4 (four) hours as needed for wheezing or shortness of breath (shortness of breath). 1 each 0     benzonatate (TESSALON PERLES) 100 MG capsule Take 1 capsule (100 mg total) by mouth 3 (three) times a day as needed for cough. 30 capsule 0     metoprolol tartrate (LOPRESSOR) 50 MG tablet TAKE 1 TABLET BY MOUTH TWICE DAILY 180 tablet 1     No current facility-administered medications for this visit.        TOBACCO USE:  History   Smoking Status     Never Smoker   Smokeless Tobacco     Never Used       VITALS:  Vitals:    06/05/18 1134   BP: 122/82   Pulse: 76   Weight: (!) 235 lb (106.6 kg)   Height: 5' (1.524 m)     Wt Readings  from Last 3 Encounters:   06/05/18 (!) 235 lb (106.6 kg)   07/10/17 (!) 234 lb (106.1 kg)   06/20/17 (!) 234 lb 14.4 oz (106.5 kg)       PHYSICAL EXAM:  Constitutional:   Reveals a pleasant female that appears stated age.  Vitals: per nursing notes.  Skin: Numerous skin tags generally one to two millimeters noted in anterior neck.   Neuro:  Alert and oriented. Cranial nerves, motor, sensory exams are intact.  No gross focal deficits.  Psychiatric:  Memory intact, mood appropriate.    DATA REVIEWED:  Additional History from Old Records Summarized (2): Reviewed cardiology 7/10/17; ventricular tachycardia, PVCs.   Decision to Obtain Records (1): None.   Radiology Tests Summarized or Ordered (1): Ordered mammogram.  Labs Reviewed or Ordered (1): None.   Medicine Test Summarized or Ordered (1): None.   Independent Review of EKG, X-RAY, or RAPID STREP (2 each): None.     The visit lasted a total of 20 minutes face to face with the patient. Over 50% of the time was spent counseling and educating the patient about skin tag removal.    I, Des Arana, am scribing for and in the presence of, Dr. Acosta.    I, Dr. Acosta, personally performed the services described in this documentation, as scribed by Des Arana in my presence, and it is both accurate and complete.    Total data points: 3

## 2021-06-19 NOTE — LETTER
Letter by Zeferino Acosta MD at      Author: Zeferino Acosta MD Service: -- Author Type: --    Filed:  Encounter Date: 5/28/2019 Status: (Other)         An Carter  [address] [place] [address]             May 28, 2019         Dear Ms. Carter,    Below are the results from your recent visit: The official radiology interpretation is chest x-ray is negative for pneumonia.    Resulted Orders   XR Chest 2 Views    Narrative    EXAM: XR CHEST 2 VIEWS  LOCATION: [facility]  DATE/TIME: 5/28/2019 11:02 AM    INDICATION: Cough for 1 week.  COMPARISON: None.    FINDINGS: Negative chest.            Please call with questions or contact us using [employer].    Sincerely,        Electronically signed by Zeferino Acosta MD

## 2021-06-20 NOTE — LETTER
Letter by Zeferino Acosta MD at      Author: Zeferino Acosta MD Service: -- Author Type: --    Filed:  Encounter Date: 1/28/2020 Status: (Other)             An Carter  [address] [place] [address]           Dear An Carter ,  Our records indicate that you are due for an annual physical and  cervical cancer screen/pap screen.  Please call the clinic or use my chart and schedule an office visit to complete this.  If you have had this done somewhere other than [facility], please help us obtain a copy of your testing/results so we can update your records.  You can also just let us know when and where you had the testing completed through a phone call or through my chart.The records can be faxed to us at [phone].  If you have not had this completed, below are numbers of clinics in the area that offer this along with our [facility] clinic.    [facility] [phone]    [facility] [phone]    [facility] [phone]

## 2021-06-20 NOTE — PROGRESS NOTES
[facility] Heart Care Note    Assessment:  1. History of repetitive mono-formic ventricular tachycardia, felt to originate from the right ventricular outflow tract.    2. Rhythm seems very well-controlled on beta-blockers.  Seems to do better on metoprolol tartrate       Episodes of bradycardia with metoprolol succinate  3. No structural heart disease with previous comprehensive evaluation, including stress nuclear scan, CT heart scan and echocardiogram.   Normal echocardiogram 5 May 2016    Plan:    Continue metoprolol tartrate 50 mg twice daily  I do not think any further cardiac studies were necessary at this time  And exercise more; with a new garner retriever, that should give you motivation to exercise  Return in 1 year for comprehensive cardiac and arrhythmia assessment      Subjective:    I had the opportunity to see.An WHIPPLE Raul , who is a 48 y.o. female with a known history of PVCs originating in the RV outflow tract  An has had a pretty rough year mainly because of family, social issues, her mother-in-law , they spent quite a bit of time dealing with her possessions etc.  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  Tries to walk but has not been very active; is thinking she will be more active as she starts to walk her new dog-a garner retriever  She has no awareness of palpitations tachydysrhythmia or ventricular tachycardia; no syncopal or near syncopal episodes  Denies excessive breathlessness no orthopnea PND or pedal edema  Prefers to take metoprolol tartrate does not seem as sluggish as when she takes the succinate preparation-also not as bradycardic   No angina  No other medical conditions  Continues with obesity, has had little success with diet        Problem List:  Patient Active Problem List   Diagnosis     Ventricular Tachycardia     PVC (premature ventricular contraction)     Medical History:  No past medical history on file.  Surgical History:  Past Surgical  History:   Procedure Laterality Date      SECTION      Description:  Section;  Recorded: 2009;  Comments: failure to progress     Social History:  Social History     Social History     Marital status:      Spouse name: N/A     Number of children: N/A     Years of education: N/A     Occupational History     Not on file.     Social History Main Topics     Smoking status: Never Smoker     Smokeless tobacco: Never Used     Alcohol use Not on file     Drug use: Not on file     Sexual activity: Not on file     Other Topics Concern     Not on file     Social History Narrative    Diet-        Exercise-       Review of Systems:      General: WNL  Eyes: WNL  Ears/Nose/Throat: WNL  Lungs: WNL  Heart: WNL  Stomach: WNL  Bladder: WNL  Muscle/Joints: WNL  Skin: WNL  Nervous System: WNL  Mental Health: WNL     Blood: WNL        Family History:  Family History   Problem Relation Age of Onset     Breast cancer Maternal Grandmother 80         Allergies:  Allergies   Allergen Reactions     Amoxicillin Nausea And Vomiting     Metoprolol Succinate Other (See Comments)     Pt felt bad, bradycardic and sick with the long acting metoprolol.     Mold Other (See Comments)     Sinus infections, runny nose.       Medications:  Current Outpatient Prescriptions   Medication Sig Dispense Refill     metoprolol tartrate (LOPRESSOR) 50 MG tablet TAKE 1 TABLET BY MOUTH TWICE DAILY 180 tablet 1     albuterol (PROAIR HFA;PROVENTIL HFA;VENTOLIN HFA) 90 mcg/actuation inhaler Inhale 1 puff every 4 (four) hours as needed for wheezing or shortness of breath (shortness of breath). 1 each 0     No current facility-administered medications for this visit.        Objective:   Vital signs:  /80 (Patient Site: Right Arm, Patient Position: Sitting, Cuff Size: Adult Large)  Pulse 74  Resp 16  Wt (!) 233 lb (105.7 kg)  BMI 45.5 kg/m2      Physical Exam:      GENERAL APPEARANCE: Alert, cooperative and in no acute  distress.  HEENT: No scleral icterus. No Xanthelasma. Oral mucous membranes pink and moist.  NECK: JVP normal cm. No Hepatojugular reflux. Thyroid not  Palpable  CHEST: clear to auscultation and percussion  CARDIOVASCULAR: S1, S2 without murmur    Brachial, radial  pulses are intact and symmetric.   No carotid bruits noted.  ABDOMEN: Non tender. BS+. No bruits.  EXTREMITIES: No cyanosis, clubbing or edema.    Lab Results:  LIPIDS:  Lab Results   Component Value Date    CHOL 169 10/31/2011     Lab Results   Component Value Date    HDL 30 (L) 10/31/2011     Lab Results   Component Value Date    LDLCALC 89.6 10/31/2011     Lab Results   Component Value Date    TRIG 247 (H) 10/31/2011     No components found for: CHOLHDL    BMP:  Lab Results   Component Value Date    CREATININE 0.7 10/31/2011    BUN 10 10/31/2011     10/31/2011    K 4.3 10/31/2011     10/31/2011    CO2 24 10/31/2011         This note has been dictated using voice recognition software. Any grammatical or context distortions are unintentional and inherent to the software.  Luis Garnica MD  [facility]  [phone]

## 2021-06-21 NOTE — LETTER
Letter by Whitley Franklin RN at      Author: Whitley Franklin RN Service: -- Author Type: --    Filed:  Encounter Date: 10/30/2020 Status: (Other)         An WHIPPLE Elengama  [address] [place] [address]             October 30, 2020         Dear Ms. Carter,    Below are the results from your recent visit:    Resulted Orders   TSH   Result Value Ref Range    TSH 1.09 0.30 - 5.00 uIU/mL   HM2(CBC w/o Differential)   Result Value Ref Range    WBC 8.5 4.0 - 11.0 thou/uL    RBC 5.26 3.80 - 5.40 mill/uL    Hemoglobin 15.2 12.0 - 16.0 g/dL    Hematocrit 47.0 35.0 - 47.0 %    MCV 89 80 - 100 fL    MCH 28.9 27.0 - 34.0 pg    MCHC 32.3 32.0 - 36.0 g/dL    RDW 12.8 11.0 - 14.5 %    Platelets 346 140 - 440 thou/uL    MPV 11.2 8.5 - 12.5 fL   Comprehensive metabolic panel   Result Value Ref Range    Sodium 141 136 - 145 mmol/L    Potassium 4.6 3.5 - 5.0 mmol/L    Chloride 104 98 - 107 mmol/L    CO2 24 22 - 31 mmol/L    Anion Gap, Calculation 13 5 - 18 mmol/L    Glucose 72 70 - 125 mg/dL    BUN 13 8 - 22 mg/dL    Creatinine 0.67 0.60 - 1.10 mg/dL    GFR MDRD Af Amer >60 >60 mL/min/1.73m2    GFR MDRD Non Af Amer >60 >60 mL/min/1.73m2    Bilirubin, Total 0.8 0.0 - 1.0 mg/dL    Calcium 10.0 8.5 - 10.5 mg/dL    Protein, Total 7.7 6.0 - 8.0 g/dL    Albumin 4.3 3.5 - 5.0 g/dL    Alkaline Phosphatase 58 45 - 120 U/L    AST 17 0 - 40 U/L    ALT 20 0 - 45 U/L    Narrative    Fasting Glucose reference range is 70-99 mg/dL per  American Diabetes Association (ADA) guidelines.     Your recent test results are listed above.  Dr Garnica has reviewed the results, they are normal, and at this time would not like to make any further changes.    If you have any questions or concerns, please don't hesitate to call.    Sincerely,    Whitley Franklin RN  [phone]

## 2021-07-21 ENCOUNTER — RECORDS - HEALTHEAST (OUTPATIENT)
Dept: ADMINISTRATIVE | Facility: CLINIC | Age: 51
End: 2021-07-21

## 2021-09-10 ENCOUNTER — TELEPHONE (OUTPATIENT)
Dept: FAMILY MEDICINE | Facility: CLINIC | Age: 51
End: 2021-09-10

## 2021-09-10 NOTE — TELEPHONE ENCOUNTER
Reason for Call: Request for an order or referral:    Order or referral being requested:   1. Cardiologist   2. Mammo     Date needed: as soon as possible    Has the patient been seen by the PCP for this problem? YES    Additional comments: Patient is scheduled on 10/05/21 for cardiologist appointment. Hasn't scheduled her mammo yet, needing order.     Phone number Patient can be reached at:  Cell number on file:    Telephone Information:   Mobile [phone]       Best Time:  ANY    Can we leave a detailed message on this number?  YES    Call taken on 9/10/2021 at 2:32 PM by TERENCE Heck

## 2021-09-13 DIAGNOSIS — I47.29 PAROXYSMAL VENTRICULAR TACHYCARDIA (H): Primary | ICD-10-CM

## 2021-09-13 DIAGNOSIS — Z12.31 ENCOUNTER FOR SCREENING MAMMOGRAM FOR BREAST CANCER: ICD-10-CM

## 2021-09-13 DIAGNOSIS — I49.3 PVC (PREMATURE VENTRICULAR CONTRACTION): ICD-10-CM

## 2021-10-05 ENCOUNTER — OFFICE VISIT (OUTPATIENT)
Dept: CARDIOLOGY | Facility: CLINIC | Age: 51
End: 2021-10-05
Payer: OTHER GOVERNMENT

## 2021-10-05 VITALS
DIASTOLIC BLOOD PRESSURE: 80 MMHG | SYSTOLIC BLOOD PRESSURE: 130 MMHG | BODY MASS INDEX: 41.62 KG/M2 | HEART RATE: 56 BPM | RESPIRATION RATE: 16 BRPM | HEIGHT: 60 IN | WEIGHT: 212 LBS

## 2021-10-05 DIAGNOSIS — I49.3 PVC (PREMATURE VENTRICULAR CONTRACTION): Primary | ICD-10-CM

## 2021-10-05 DIAGNOSIS — I47.29 PAROXYSMAL VENTRICULAR TACHYCARDIA (H): ICD-10-CM

## 2021-10-05 PROCEDURE — 99214 OFFICE O/P EST MOD 30 MIN: CPT | Performed by: INTERNAL MEDICINE

## 2021-10-05 ASSESSMENT — MIFFLIN-ST. JEOR: SCORE: 1502.1

## 2021-10-05 NOTE — PROGRESS NOTES
[facility] Heart Care Note    1. History of repetitive mono-formic ventricular tachycardia, felt to originate from the right ventricular outflow tract.    2. Rhythm seems very well-controlled on beta-blockers.  Seems to do better on metoprolol tartrate       Episodes of bradycardia with metoprolol succinate  3. No structural heart disease with previous comprehensive evaluation, including stress nuclear scan, CT heart scan and echocardiogram.   Normal echocardiogram 5 May 2016      Plan:    You have done very well over the past year  Almost no palpitations while taking metoprolol 25 mg twice daily  Continue a good exercise program  No medicine adjustments  Should have a fasting lipid panel has been done recetly;  but when it was done-9 years ago it was quite good  Return in 1 year or sooner as need arises        Subjective:    I had the opportunity to see.An SARABJIT Carter , who is a 51 year old female with a known history of palpitations related to PVCs and salvos of nonsustained ventricular tachycardia  Continues on metoprolol tartrate 25 mg twice daily.  Has essentially no palpitations  No chest pain no excessive breathlessness  Exercise capacity is good  He has not been doing so well in her diet, has gained back some of her pounds that she lost  Now working full-time as a nurse at [employer]  Discussed cutting back on metoprolol to see if that would continue to control palpitations but she seems comfortable on the 25 twice daily  On higher doses of beta-blockers she felt bradycardic but by her watch, and her resting heart rate is 50-62 with walking about 85 and with exercise she gets in the low 100s  Not had a lipid panel for 9 years at that time it was excellent      Problem List:  Patient Active Problem List   Diagnosis     Ventricular Tachycardia     PVC (premature ventricular contraction)     Medical History:  No past medical history on file.  Surgical History:  Past Surgical History:   Procedure Laterality  Date      SECTION      Description:  Section;  Recorded: 2009;  Comments: failure to progress     Social History:  Social History     Socioeconomic History     Marital status:      Spouse name: Not on file     Number of children: Not on file     Years of education: Not on file     Highest education level: Not on file   Occupational History     Not on file   Tobacco Use     Smoking status: Never Smoker     Smokeless tobacco: Never Used   Substance and Sexual Activity     Alcohol use: Not on file     Drug use: Not on file     Sexual activity: Not on file   Other Topics Concern     Not on file   Social History Narrative    Diet-    Exercise-     Social Determinants of Health     Financial Resource Strain:      Difficulty of Paying Living Expenses:    Food Insecurity:      Worried About Running Out of Food in the Last Year:      Ran Out of Food in the Last Year:    Transportation Needs:      Lack of Transportation (Medical):      Lack of Transportation (Non-Medical):    Physical Activity:      Days of Exercise per Week:      Minutes of Exercise per Session:    Stress:      Feeling of Stress :    Social Connections:      Frequency of Communication with Friends and Family:      Frequency of Social Gatherings with Friends and Family:      Attends Christianity Services:      Active Member of Clubs or Organizations:      Attends Club or Organization Meetings:      Marital Status:    Intimate Partner Violence:      Fear of Current or Ex-Partner:      Emotionally Abused:      Physically Abused:      Sexually Abused:        Review of Systems   ,         Family History:  Family History   Problem Relation Age of Onset     Breast Cancer Maternal Grandmother 80.00         Allergies:  Allergies   Allergen Reactions     Amoxicillin Nausea and Vomiting     Metoprolol Succinate [Metoprolol] Other (See Comments)     Pt felt bad, bradycardic and sick with the long acting metoprolol.     Mold [Molds & Smuts]  "Other (See Comments)     Sinus infections, runny nose.     Erythromycin Nausea and Vomiting       Medications:  Current Outpatient Medications   Medication Sig Dispense Refill     ascorbic acid, vitamin C, (ASCORBIC ACID WITH KATIE HIPS) 500 MG tablet Take 500 mg by mouth daily as needed        metoprolol tartrate (LOPRESSOR) 25 MG tablet [METOPROLOL TARTRATE (LOPRESSOR) 25 MG TABLET] Take 1 tablet (25 mg total) by mouth 2 (two) times a day. 180 tablet 3     multivit with minerals/lutein (MULTIVITAMIN 50 PLUS ORAL) Take by mouth twice a week        albuterol (PROAIR HFA;PROVENTIL HFA;VENTOLIN HFA) 90 mcg/actuation inhaler [ALBUTEROL (PROAIR HFA;PROVENTIL HFA;VENTOLIN HFA) 90 MCG/ACTUATION INHALER] Inhale 1 puff every 4 (four) hours as needed for wheezing or shortness of breath (shortness of breath). (Patient not taking: Reported on 10/5/2021) 1 each 0     apple cider vinegar 600 mg cap [APPLE CIDER VINEGAR 600 MG CAP] Take by mouth. (Patient not taking: Reported on 10/5/2021)       codeine-guaiFENesin (GUAIFENESIN AC)  mg/5 mL liquid [CODEINE-GUAIFENESIN (GUAIFENESIN AC)  MG/5 ML LIQUID] Take 10 mL by mouth 4 (four) times a day as needed. (Patient not taking: Reported on 10/5/2021) 240 mL 0     ergocalciferol (VITAMIN D2) 1,250 mcg (50,000 unit) capsule [ERGOCALCIFEROL (VITAMIN D2) 1,250 MCG (50,000 UNIT) CAPSULE] Take 50,000 Units by mouth every 7 days. 1,000 units (Patient not taking: Reported on 10/5/2021)         Objective:   Vital signs:  /80 (BP Location: Right arm, Patient Position: Sitting, Cuff Size: Adult Regular)   Pulse 56   Resp 16   Ht 1.53 m (5' 0.25\")   Wt 96.2 kg (212 lb)   BMI 41.06 kg/m      Pressure 120/70 sitting, 124 standing  Heart rate is 60 and regular without ectopy  Physical Exam:      GENERAL APPEARANCE: Alert, cooperative and in no acute distress.  HEENT: No scleral icterus. No Xanthelasma. Oral mucous membranes pink and moist.  NECK: JVP  Normal cm. No " Hepatojugular reflux. Thyroid not  Palpable  CHEST: clear to auscultation and percussion  CARDIOVASCULAR: S1, S2 without murmur    Brachial, radial  pulses are intact and symmetric.   No carotid bruits noted.  ABDOMEN: Non tender. BS+. No bruits.  EXTREMITIES: No cyanosis, clubbing or edema.    Lab Results:  LIPIDS:  Lab Results   Component Value Date    CHOL 169 10/31/2011     Lab Results   Component Value Date    HDL 30 (L) 10/31/2011     No components found for: LDLCALC  Lab Results   Component Value Date    TRIG 247 (H) 10/31/2011     No components found for: CHOLHDL    BMP:  Lab Results   Component Value Date    BUN 13 10/29/2020     10/29/2020    CO2 24 10/29/2020         This note has been dictated using voice recognition software. Any grammatical or context distortions are unintentional and inherent to the software.  Luis Garnica MD  [facility]  [phone]

## 2021-10-05 NOTE — LETTER
10/5/2021    Zeferino Acosta MD  [address] [place] [address]    RE: An WHIPPLE Raul       Dear Colleague,    I had the pleasure of seeing An Albrechtolafwinnie in the [facility].    [facility] Heart Care Note    1. History of repetitive mono-formic ventricular tachycardia, felt to originate from the right ventricular outflow tract.    2. Rhythm seems very well-controlled on beta-blockers.  Seems to do better on metoprolol tartrate       Episodes of bradycardia with metoprolol succinate  3. No structural heart disease with previous comprehensive evaluation, including stress nuclear scan, CT heart scan and echocardiogram.   Normal echocardiogram 5 May 2016      Plan:    You have done very well over the past year  Almost no palpitations while taking metoprolol 25 mg twice daily  Continue a good exercise program  No medicine adjustments  Should have a fasting lipid panel has been done recetly;  but when it was done-9 years ago it was quite good  Return in 1 year or sooner as need arises        Subjective:    I had the opportunity to see.An WHIPPLE Jonoben , who is a 51 year old female with a known history of palpitations related to PVCs and salvos of nonsustained ventricular tachycardia  Continues on metoprolol tartrate 25 mg twice daily.  Has essentially no palpitations  No chest pain no excessive breathlessness  Exercise capacity is good  He has not been doing so well in her diet, has gained back some of her pounds that she lost  Now working full-time as a nurse at [employer]  Discussed cutting back on metoprolol to see if that would continue to control palpitations but she seems comfortable on the 25 twice daily  On higher doses of beta-blockers she felt bradycardic but by her watch, and her resting heart rate is 50-62 with walking about 85 and with exercise she gets in the low 100s  Not had a lipid panel for 9 years at that time it was  excellent      Problem List:  Patient Active Problem List   Diagnosis     Ventricular Tachycardia     PVC (premature ventricular contraction)     Medical History:  No past medical history on file.  Surgical History:  Past Surgical History:   Procedure Laterality Date      SECTION      Description:  Section;  Recorded: 2009;  Comments: failure to progress     Social History:  Social History     Socioeconomic History     Marital status:      Spouse name: Not on file     Number of children: Not on file     Years of education: Not on file     Highest education level: Not on file   Occupational History     Not on file   Tobacco Use     Smoking status: Never Smoker     Smokeless tobacco: Never Used   Substance and Sexual Activity     Alcohol use: Not on file     Drug use: Not on file     Sexual activity: Not on file   Other Topics Concern     Not on file   Social History Narrative    Diet-    Exercise-     Social Determinants of Health     Financial Resource Strain:      Difficulty of Paying Living Expenses:    Food Insecurity:      Worried About Running Out of Food in the Last Year:      Ran Out of Food in the Last Year:    Transportation Needs:      Lack of Transportation (Medical):      Lack of Transportation (Non-Medical):    Physical Activity:      Days of Exercise per Week:      Minutes of Exercise per Session:    Stress:      Feeling of Stress :    Social Connections:      Frequency of Communication with Friends and Family:      Frequency of Social Gatherings with Friends and Family:      Attends Congregation Services:      Active Member of Clubs or Organizations:      Attends Club or Organization Meetings:      Marital Status:    Intimate Partner Violence:      Fear of Current or Ex-Partner:      Emotionally Abused:      Physically Abused:      Sexually Abused:        Review of Systems   ,         Family History:  Family History   Problem Relation Age of Onset     Breast Cancer Maternal  "Grandmother 80.00         Allergies:  Allergies   Allergen Reactions     Amoxicillin Nausea and Vomiting     Metoprolol Succinate [Metoprolol] Other (See Comments)     Pt felt bad, bradycardic and sick with the long acting metoprolol.     Mold [Molds & Smuts] Other (See Comments)     Sinus infections, runny nose.     Erythromycin Nausea and Vomiting       Medications:  Current Outpatient Medications   Medication Sig Dispense Refill     ascorbic acid, vitamin C, (ASCORBIC ACID WITH KATIE HIPS) 500 MG tablet Take 500 mg by mouth daily as needed        metoprolol tartrate (LOPRESSOR) 25 MG tablet [METOPROLOL TARTRATE (LOPRESSOR) 25 MG TABLET] Take 1 tablet (25 mg total) by mouth 2 (two) times a day. 180 tablet 3     multivit with minerals/lutein (MULTIVITAMIN 50 PLUS ORAL) Take by mouth twice a week        albuterol (PROAIR HFA;PROVENTIL HFA;VENTOLIN HFA) 90 mcg/actuation inhaler [ALBUTEROL (PROAIR HFA;PROVENTIL HFA;VENTOLIN HFA) 90 MCG/ACTUATION INHALER] Inhale 1 puff every 4 (four) hours as needed for wheezing or shortness of breath (shortness of breath). (Patient not taking: Reported on 10/5/2021) 1 each 0     apple cider vinegar 600 mg cap [APPLE CIDER VINEGAR 600 MG CAP] Take by mouth. (Patient not taking: Reported on 10/5/2021)       codeine-guaiFENesin (GUAIFENESIN AC)  mg/5 mL liquid [CODEINE-GUAIFENESIN (GUAIFENESIN AC)  MG/5 ML LIQUID] Take 10 mL by mouth 4 (four) times a day as needed. (Patient not taking: Reported on 10/5/2021) 240 mL 0     ergocalciferol (VITAMIN D2) 1,250 mcg (50,000 unit) capsule [ERGOCALCIFEROL (VITAMIN D2) 1,250 MCG (50,000 UNIT) CAPSULE] Take 50,000 Units by mouth every 7 days. 1,000 units (Patient not taking: Reported on 10/5/2021)         Objective:   Vital signs:  /80 (BP Location: Right arm, Patient Position: Sitting, Cuff Size: Adult Regular)   Pulse 56   Resp 16   Ht 1.53 m (5' 0.25\")   Wt 96.2 kg (212 lb)   BMI 41.06 kg/m      Pressure 120/70 sitting, 124 " standing  Heart rate is 60 and regular without ectopy  Physical Exam:      GENERAL APPEARANCE: Alert, cooperative and in no acute distress.  HEENT: No scleral icterus. No Xanthelasma. Oral mucous membranes pink and moist.  NECK: JVP  Normal cm. No Hepatojugular reflux. Thyroid not  Palpable  CHEST: clear to auscultation and percussion  CARDIOVASCULAR: S1, S2 without murmur    Brachial, radial  pulses are intact and symmetric.   No carotid bruits noted.  ABDOMEN: Non tender. BS+. No bruits.  EXTREMITIES: No cyanosis, clubbing or edema.    Lab Results:  LIPIDS:  Lab Results   Component Value Date    CHOL 169 10/31/2011     Lab Results   Component Value Date    HDL 30 (L) 10/31/2011     No components found for: LDLCALC  Lab Results   Component Value Date    TRIG 247 (H) 10/31/2011     No components found for: CHOLHDL    BMP:  Lab Results   Component Value Date    BUN 13 10/29/2020     10/29/2020    CO2 24 10/29/2020         This note has been dictated using voice recognition software. Any grammatical or context distortions are unintentional and inherent to the software.  Luis Garnica MD  [facility]  [phone]          Thank you for allowing me to participate in the care of your patient.      Sincerely,     Luis Garnica MD, MD     [facility]  cc:   Luis Garnica MD  [address] [place] [address]

## 2021-10-05 NOTE — PATIENT INSTRUCTIONS
An Carter    Thank you for coming to the [facility] today for a cardiac evaluation  It was my pleasure to see you today  A good contact for any questions would be Whitley Franklin  RN @ [phone]  You have done very well over the past year  Almost no palpitations while taking metoprolol 25 mg twice daily  Continue a good exercise program  No medicine adjustments  Should have a fasting lipid panel has been done recetly;  but when it was done-9 years ago it was quite good  Return in 1 year or sooner as need arises

## 2021-11-15 DIAGNOSIS — I49.3 PVC (PREMATURE VENTRICULAR CONTRACTION): ICD-10-CM

## 2021-11-15 RX ORDER — METOPROLOL TARTRATE 25 MG/1
TABLET, FILM COATED ORAL
Qty: 180 TABLET | Refills: 3 | Status: SHIPPED | OUTPATIENT
Start: 2021-11-15 | End: 2022-09-12

## 2021-12-02 ENCOUNTER — HOSPITAL ENCOUNTER (OUTPATIENT)
Dept: MAMMOGRAPHY | Facility: CLINIC | Age: 51
Discharge: HOME | End: 2021-12-02
Attending: FAMILY MEDICINE | Admitting: FAMILY MEDICINE
Payer: OTHER GOVERNMENT

## 2021-12-02 DIAGNOSIS — Z12.31 ENCOUNTER FOR SCREENING MAMMOGRAM FOR BREAST CANCER: ICD-10-CM

## 2021-12-02 PROCEDURE — 77067 SCR MAMMO BI INCL CAD: CPT

## 2021-12-11 ENCOUNTER — HEALTH MAINTENANCE LETTER (OUTPATIENT)
Age: 51
End: 2021-12-11

## 2022-03-31 DIAGNOSIS — I49.3 PVC (PREMATURE VENTRICULAR CONTRACTION): Primary | ICD-10-CM

## 2022-03-31 DIAGNOSIS — I47.29 PAROXYSMAL VENTRICULAR TACHYCARDIA (H): ICD-10-CM

## 2022-04-11 ENCOUNTER — OFFICE VISIT (OUTPATIENT)
Dept: FAMILY MEDICINE | Facility: CLINIC | Age: 52
End: 2022-04-11
Payer: OTHER GOVERNMENT

## 2022-04-11 VITALS
BODY MASS INDEX: 44.16 KG/M2 | TEMPERATURE: 98.2 F | HEART RATE: 94 BPM | OXYGEN SATURATION: 97 % | DIASTOLIC BLOOD PRESSURE: 93 MMHG | WEIGHT: 228 LBS | RESPIRATION RATE: 16 BRPM | SYSTOLIC BLOOD PRESSURE: 146 MMHG

## 2022-04-11 DIAGNOSIS — H16.001 CORNEAL ULCER OF RIGHT EYE: Primary | ICD-10-CM

## 2022-04-11 PROCEDURE — 99213 OFFICE O/P EST LOW 20 MIN: CPT | Performed by: PHYSICIAN ASSISTANT

## 2022-04-11 RX ORDER — MOXIFLOXACIN 5 MG/ML
1 SOLUTION/ DROPS OPHTHALMIC
Qty: 3 ML | Refills: 0 | Status: SHIPPED | OUTPATIENT
Start: 2022-04-11 | End: 2022-09-23

## 2022-04-11 ASSESSMENT — ENCOUNTER SYMPTOMS
PHOTOPHOBIA: 1
EYE ITCHING: 1
EYE REDNESS: 1
EYE DISCHARGE: 1
EYE PAIN: 0

## 2022-04-11 NOTE — PATIENT INSTRUCTIONS
1.  Begin eyedrops according to bottle instructions.  2.  Continue to avoid wearing her contacts.  3.  Follow-up with [facility] as soon as you can.  Call the appointments line number tomorrow.  4.  I would discontinue using Clear Eyes and instead consider using refresh eyedrops if you feel any eye dryness.  5.  Seek emergency medical attention if you develop any more significant eye pain or visual disturbances.

## 2022-04-11 NOTE — PROGRESS NOTES
Patient presents with:  Redness/discharge Of Eye: X2-3 day        Clinical Decision Making: Patient has been inflamed ulcer present on right cornea.  This may be secondary to new contact use.  Patient will continue to avoid using her contacts.  She was started on moxifloxacin eyedrops today.  Referral made for eye clear clinic.  Patient has no visual disturbances, negative Hawa exam, and no severe eye pain.  I do feel that follow-up in 1 to 2 days is reasonable.      ICD-10-CM    1. Corneal ulcer of right eye  H16.001 moxifloxacin (VIGAMOX) 0.5 % ophthalmic solution     Adult Eye Referral       Patient Instructions   1.  Begin eyedrops according to bottle instructions.  2.  Continue to avoid wearing her contacts.  3.  Follow-up with [facility] as soon as you can.  Call the appointments line number tomorrow.  4.  I would discontinue using Clear Eyes and instead consider using refresh eyedrops if you feel any eye dryness.  5.  Seek emergency medical attention if you develop any more significant eye pain or visual disturbances.        HPI:  An Carter is a 52 year old female who presents today complaining of right eye pain and redness when she woke up 2 to 3 days ago.  She felt like there was something in her eye.  She denies remember getting any thing in her eye, and she was not able to see anything.  She washed her eye out in the shower and felt slightly better.  She is had some intermittent pains when going outside, but they are short-lived.  She denies any other visual disturbances.  She wears contacts, but stopped wearing her contacts over the last couple of days since her symptoms began.    History obtained from the patient.    Problem List:  2017-07: PVC (premature ventricular contraction)  Ventricular Tachycardia      No past medical history on file.    Social History     Tobacco Use     Smoking status: Never Smoker     Smokeless tobacco: Never Used   Substance Use Topics     Alcohol use:  Not on file       Review of Systems   Eyes: Positive for photophobia (for only a moment), discharge (tearing), redness and itching (mild). Negative for pain and visual disturbance.       Vitals:    04/11/22 1651   BP: (!) 146/93   BP Location: Left arm   Patient Position: Sitting   Cuff Size: Adult Large   Pulse: 94   Resp: 16   Temp: 98.2  F (36.8  C)   TempSrc: Oral   SpO2: 97%   Weight: 103.4 kg (228 lb)       Physical Exam  Vitals and nursing note reviewed.   Constitutional:       General: She is not in acute distress.     Appearance: She is not toxic-appearing or diaphoretic.   HENT:      Head: Normocephalic and atraumatic.      Right Ear: External ear normal.      Left Ear: External ear normal.   Eyes:      General: Lids are normal.      Extraocular Movements: Extraocular movements intact.      Conjunctiva/sclera:      Right eye: Right conjunctiva is injected. No chemosis, exudate or hemorrhage.     Pupils: Pupils are equal, round, and reactive to light.      Right eye: No corneal abrasion. Hawa exam negative.        Comments: No pain with bright lights.   Pulmonary:      Effort: Pulmonary effort is normal. No respiratory distress.   Neurological:      Mental Status: She is alert.   Psychiatric:         Mood and Affect: Mood normal.         Behavior: Behavior normal.         Thought Content: Thought content normal.         Judgment: Judgment normal.         At the end of the encounter, I discussed results, diagnosis, medications. Discussed red flags for immediate return to clinic/ER, as well as indications for follow up if no improvement. Patient understood and agreed to plan. Patient was stable for discharge.

## 2022-09-12 DIAGNOSIS — I49.3 PVC (PREMATURE VENTRICULAR CONTRACTION): ICD-10-CM

## 2022-09-12 RX ORDER — METOPROLOL TARTRATE 25 MG/1
TABLET, FILM COATED ORAL
Qty: 180 TABLET | Refills: 3 | Status: SHIPPED | OUTPATIENT
Start: 2022-09-12 | End: 2022-09-23

## 2022-09-23 ENCOUNTER — OFFICE VISIT (OUTPATIENT)
Dept: CARDIOLOGY | Facility: CLINIC | Age: 52
End: 2022-09-23
Attending: INTERNAL MEDICINE
Payer: OTHER GOVERNMENT

## 2022-09-23 VITALS
DIASTOLIC BLOOD PRESSURE: 72 MMHG | RESPIRATION RATE: 16 BRPM | WEIGHT: 242 LBS | HEART RATE: 83 BPM | BODY MASS INDEX: 46.87 KG/M2 | SYSTOLIC BLOOD PRESSURE: 162 MMHG

## 2022-09-23 DIAGNOSIS — E66.01 CLASS 3 SEVERE OBESITY DUE TO EXCESS CALORIES WITHOUT SERIOUS COMORBIDITY WITH BODY MASS INDEX (BMI) OF 45.0 TO 49.9 IN ADULT (H): ICD-10-CM

## 2022-09-23 DIAGNOSIS — I49.3 PVC (PREMATURE VENTRICULAR CONTRACTION): Primary | ICD-10-CM

## 2022-09-23 DIAGNOSIS — E66.813 CLASS 3 SEVERE OBESITY DUE TO EXCESS CALORIES WITHOUT SERIOUS COMORBIDITY WITH BODY MASS INDEX (BMI) OF 45.0 TO 49.9 IN ADULT (H): ICD-10-CM

## 2022-09-23 DIAGNOSIS — I47.29 PAROXYSMAL VENTRICULAR TACHYCARDIA (H): ICD-10-CM

## 2022-09-23 PROCEDURE — 99214 OFFICE O/P EST MOD 30 MIN: CPT | Performed by: INTERNAL MEDICINE

## 2022-09-23 RX ORDER — METOPROLOL TARTRATE 50 MG
TABLET ORAL
Qty: 180 TABLET | Refills: 3 | Status: SHIPPED | OUTPATIENT
Start: 2022-09-23 | End: 2023-10-05

## 2022-09-23 NOTE — PATIENT INSTRUCTIONS
It was a pleasure to meet with you today.      Below is a summary of your visit.   Increase your metoprolol to 50 mg twice daily.  Work on permanent lifestyle changes with diet and exercise in an effort to lose weight.  Follow up with me in a year or sooner if needed.     Please do not hesitate to call the [facility] with any questions or concerns at [phone].     Sincerely,

## 2022-09-23 NOTE — LETTER
9/23/2022    Zeferino Acosta MD  [address] [place] [address]    RE: An Carter       Dear Colleague,     I had the pleasure of seeing An Carter in the [facility].    [facility]   [address], [place] [address]   [email or web address]   OFFICE: [phone]     CARDIOLOGY CLINIC NOTE     Thank you, Dr. Acosta, Zeferino SMALLWOOD, for asking the [facility] team to see Ms. An Carter to  Follow Up (PVCs, NSVT)         Assessment/Recommendations   Assessment:    1. 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  2. Hypertension - BP is elevated today.  3. Obesity with BMI 47 without comorbid condition    Plan:  1. Increase metoprolol to 50mg BID  2. Discussed and recommended lifestyle changes through diet and exercise in an effort to lose weight.  3. Follow up with me in a year or sooner if needed.         History of Present Illness   Ms. An Carter is a 52 year old female who presents for follow-up of PVCs and paroxysmal nonsustained ventricular tachycardia.     has a history of palpitations related to PVCs and salvos of nonsustained ventricular tachycardia dating back to around 2011. She is treated with metoprolol titrate and her most recent Holter monitor from 5 years ago demonstrated near resolution of her ventricular ectopy.    An continues to have very few symptoms of palpitations and is controlled with metoprolol. She has gained about 40 lbs in the past year and 60 lbs over the past 2 years. She was previously exercising regularly and involved in weight watchers, but has not continued these over the past year or two.    Other than noted above, Ms. Carter denies any chest pain/pressure/tightness, shortness of breath at rest or with exertion, light headedness/dizziness, pre-syncope, syncope, lower extremity swelling,  palpitations, paroxysmal nocturnal dyspnea (PND), or orthopnea.     Cardiac Problems and Cardiac Diagnostics     Most Recent Cardiac testing:    Holter monitor 7/26/17  Baseline electrocardiogram shows sinus rhythm with normal electrocardiographic intervals  The average heart rate is 74 bpm with heart rate ranging between  bpm  Quite rare ventricular ectopy seen; 6 single PVCs  Rare supraventricular ectopy.  The computer notes 66 PACs perhaps somewhat of an overcount.  A number of these are actually return beat  Or some sinus arrhythmia.  4 atrial couplets are seen but there is no atrial tachycardia, no atrial fibrillation     Symptoms of faint palpitation noted at 4:39 PM and again around 10:35 PM but did not clearly correlate to arrhythmias     Conclusion essentially normal Holter monitor.    Ventricular ectopy is nearly absent    ECHO (report reviewed):   TTE 5/5/16   Summary   Normal left ventricular size.   Mild concentric left ventricular hypertrophy.   Left ventricular ejection fraction is visually estimated to be 60%.   No regional wall motion abnormalities.   Mild mitral regurgitation.   Mild tricuspid regurgitation.    CT coronary angiogram  CONCLUSIONS:   1.  Normal right ventricular size and systolic function without areas of   wall motion abnormality or aneurysm. No CT evidence of arrhythmogenic   right ventricular dysplasia.   2.  Suboptimal study for looking at the coronary arteries. However, within   that limitation, the coronary arteries appear normal, and the calcium   score is zero.   3.  Please review radiology portion of incidental noncardiac findings.          Medications  Allergies   Current Outpatient Medications   Medication Sig Dispense Refill     metoprolol tartrate (LOPRESSOR) 50 MG tablet TAKE 1 TABLET(25 MG) BY MOUTH TWICE DAILY 180 tablet 3      Allergies   Allergen Reactions     Amoxicillin Nausea and Vomiting     Metoprolol Other (See Comments)     Pt felt bad, bradycardic and  sick with the long acting metoprolol.     Mold Other (See Comments)     Sinus infections, runny nose.  Sinus infections, runny nose.     Mold [Molds & Smuts] Other (See Comments)     Sinus infections, runny nose.     Erythromycin Nausea and Vomiting        Physical Examination Review of Systems   Vitals: BP (!) 162/72 (BP Location: Left arm, Patient Position: Sitting, Cuff Size: Adult Large)   Pulse 83   Resp 16   Wt 109.8 kg (242 lb)   BMI 46.87 kg/m    BMI= Body mass index is 46.87 kg/m .  Wt Readings from Last 3 Encounters:   22 109.8 kg (242 lb)   22 103.4 kg (228 lb)   10/05/21 96.2 kg (212 lb)       General Appearance:   Pleasant female, appears stated age. no acute distress, obese body habitus   EYES:  no scleral icterus, normal conjunctivae   Neck: supple   Respiratory:   lungs are clear to auscultation, no rales or wheezing, equal chest wall expansion    Cardiovascular:   Regular rhythm, normal rate. Normal first and second heart sounds with no murmurs, rubs, or gallops; the carotid, radial and posterior tibial pulses are intact, Jugular venous pressure normal, no edema bilaterally    Extremities: no cyanosis or clubbing   Skin: no xanthelasma, warm.    Heme/lymph/ Immunology No apparent bleeding noted.   Neurologic: Alert and oriented. normal gait, no tremors   Psychiatric: Pleasant, calm, appropriate affect.         Please refer above for cardiac ROS details.       Past History   Past Medical History: History reviewed. No pertinent past medical history.     Past Surgical History:   Past Surgical History:   Procedure Laterality Date      SECTION      Description:  Section;  Recorded: 2009;  Comments: failure to progress        Family History:   Family History   Problem Relation Age of Onset     Breast Cancer Maternal Grandmother 80.00        Social History:   Social History     Socioeconomic History     Marital status:      Spouse name: Not on file     Number of  children: Not on file     Years of education: Not on file     Highest education level: Not on file   Occupational History     Not on file   Tobacco Use     Smoking status: Never Smoker     Smokeless tobacco: Never Used   Substance and Sexual Activity     Alcohol use: Not on file     Drug use: Not on file     Sexual activity: Not on file   Other Topics Concern     Not on file   Social History Narrative    Diet-    Exercise-     Social Determinants of Health     Financial Resource Strain: Not on file   Food Insecurity: Not on file   Transportation Needs: Not on file   Physical Activity: Not on file   Stress: Not on file   Social Connections: Not on file   Intimate Partner Violence: Not on file   Housing Stability: Not on file            Lab Results    Chemistry/lipid CBC Cardiac Enzymes/BNP/TSH/INR   Lab Results   Component Value Date    CHOL 169 10/31/2011    HDL 30 (L) 10/31/2011    TRIG 247 (H) 10/31/2011    BUN 13 10/29/2020     10/29/2020    CO2 24 10/29/2020    Lab Results   Component Value Date    WBC 8.5 10/29/2020    HGB 15.2 10/29/2020    HCT 47.0 10/29/2020    MCV 89 10/29/2020     10/29/2020    Lab Results   Component Value Date    TSH 1.09 10/29/2020                Thank you for allowing me to participate in the care of your patient.      Sincerely,     Reji Santillan MD     [facility]  cc:   Nury Way MD  [address]  [place] [address]

## 2022-09-23 NOTE — PROGRESS NOTES
[facility] HE[facility] CARE   [address], [place] [address]   [email or web address]   OFFICE: [phone]     CARDIOLOGY CLINIC NOTE     Thank you, Zeferino Marc, for asking the [facility] Care team to see Ms. An Carter to  Follow Up (PVCs, NSVT)         Assessment/Recommendations   Assessment:    1. 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  2. Hypertension - BP is elevated today.  3. Obesity with BMI 47 without comorbid condition    Plan:  1. Increase metoprolol to 50mg BID  2. Discussed and recommended lifestyle changes through diet and exercise in an effort to lose weight.  3. Follow up with me in a year or sooner if needed.         History of Present Illness   Ms. An Carter is a 52 year old female who presents for follow-up of PVCs and paroxysmal nonsustained ventricular tachycardia.     has a history of palpitations related to PVCs and salvos of nonsustained ventricular tachycardia dating back to around 2011. She is treated with metoprolol titrate and her most recent Holter monitor from 5 years ago demonstrated near resolution of her ventricular ectopy.    An continues to have very few symptoms of palpitations and is controlled with metoprolol. She has gained about 40 lbs in the past year and 60 lbs over the past 2 years. She was previously exercising regularly and involved in weight watchers, but has not continued these over the past year or two.    Other than noted above, Ms. Carter denies any chest pain/pressure/tightness, shortness of breath at rest or with exertion, light headedness/dizziness, pre-syncope, syncope, lower extremity swelling, palpitations, paroxysmal nocturnal dyspnea (PND), or orthopnea.     Cardiac Problems and Cardiac Diagnostics     Most Recent Cardiac testing:    Holter monitor 7/26/17  Baseline electrocardiogram shows sinus rhythm with normal  electrocardiographic intervals  The average heart rate is 74 bpm with heart rate ranging between  bpm  Quite rare ventricular ectopy seen; 6 single PVCs  Rare supraventricular ectopy.  The computer notes 66 PACs perhaps somewhat of an overcount.  A number of these are actually return beat  Or some sinus arrhythmia.  4 atrial couplets are seen but there is no atrial tachycardia, no atrial fibrillation     Symptoms of faint palpitation noted at 4:39 PM and again around 10:35 PM but did not clearly correlate to arrhythmias     Conclusion essentially normal Holter monitor.    Ventricular ectopy is nearly absent    ECHO (report reviewed):   TTE 5/5/16   Summary   Normal left ventricular size.   Mild concentric left ventricular hypertrophy.   Left ventricular ejection fraction is visually estimated to be 60%.   No regional wall motion abnormalities.   Mild mitral regurgitation.   Mild tricuspid regurgitation.    CT coronary angiogram  CONCLUSIONS:   1.  Normal right ventricular size and systolic function without areas of   wall motion abnormality or aneurysm. No CT evidence of arrhythmogenic   right ventricular dysplasia.   2.  Suboptimal study for looking at the coronary arteries. However, within   that limitation, the coronary arteries appear normal, and the calcium   score is zero.   3.  Please review radiology portion of incidental noncardiac findings.          Medications  Allergies   Current Outpatient Medications   Medication Sig Dispense Refill     metoprolol tartrate (LOPRESSOR) 50 MG tablet TAKE 1 TABLET(25 MG) BY MOUTH TWICE DAILY 180 tablet 3      Allergies   Allergen Reactions     Amoxicillin Nausea and Vomiting     Metoprolol Other (See Comments)     Pt felt bad, bradycardic and sick with the long acting metoprolol.     Mold Other (See Comments)     Sinus infections, runny nose.  Sinus infections, runny nose.     Mold [Molds & Smuts] Other (See Comments)     Sinus infections, runny nose.      Erythromycin Nausea and Vomiting        Physical Examination Review of Systems   Vitals: BP (!) 162/72 (BP Location: Left arm, Patient Position: Sitting, Cuff Size: Adult Large)   Pulse 83   Resp 16   Wt 109.8 kg (242 lb)   BMI 46.87 kg/m    BMI= Body mass index is 46.87 kg/m .  Wt Readings from Last 3 Encounters:   22 109.8 kg (242 lb)   22 103.4 kg (228 lb)   10/05/21 96.2 kg (212 lb)       General Appearance:   Pleasant female, appears stated age. no acute distress, obese body habitus   EYES:  no scleral icterus, normal conjunctivae   Neck: supple   Respiratory:   lungs are clear to auscultation, no rales or wheezing, equal chest wall expansion    Cardiovascular:   Regular rhythm, normal rate. Normal first and second heart sounds with no murmurs, rubs, or gallops; the carotid, radial and posterior tibial pulses are intact, Jugular venous pressure normal, no edema bilaterally    Extremities: no cyanosis or clubbing   Skin: no xanthelasma, warm.    Heme/lymph/ Immunology No apparent bleeding noted.   Neurologic: Alert and oriented. normal gait, no tremors   Psychiatric: Pleasant, calm, appropriate affect.         Please refer above for cardiac ROS details.       Past History   Past Medical History: History reviewed. No pertinent past medical history.     Past Surgical History:   Past Surgical History:   Procedure Laterality Date      SECTION      Description:  Section;  Recorded: 2009;  Comments: failure to progress        Family History:   Family History   Problem Relation Age of Onset     Breast Cancer Maternal Grandmother 80.00        Social History:   Social History     Socioeconomic History     Marital status:      Spouse name: Not on file     Number of children: Not on file     Years of education: Not on file     Highest education level: Not on file   Occupational History     Not on file   Tobacco Use     Smoking status: Never Smoker     Smokeless tobacco: Never  Used   Substance and Sexual Activity     Alcohol use: Not on file     Drug use: Not on file     Sexual activity: Not on file   Other Topics Concern     Not on file   Social History Narrative    Diet-    Exercise-     Social Determinants of Health     Financial Resource Strain: Not on file   Food Insecurity: Not on file   Transportation Needs: Not on file   Physical Activity: Not on file   Stress: Not on file   Social Connections: Not on file   Intimate Partner Violence: Not on file   Housing Stability: Not on file            Lab Results    Chemistry/lipid CBC Cardiac Enzymes/BNP/TSH/INR   Lab Results   Component Value Date    CHOL 169 10/31/2011    HDL 30 (L) 10/31/2011    TRIG 247 (H) 10/31/2011    BUN 13 10/29/2020     10/29/2020    CO2 24 10/29/2020    Lab Results   Component Value Date    WBC 8.5 10/29/2020    HGB 15.2 10/29/2020    HCT 47.0 10/29/2020    MCV 89 10/29/2020     10/29/2020    Lab Results   Component Value Date    TSH 1.09 10/29/2020

## 2022-10-01 ENCOUNTER — HEALTH MAINTENANCE LETTER (OUTPATIENT)
Age: 52
End: 2022-10-01

## 2023-02-05 ENCOUNTER — HEALTH MAINTENANCE LETTER (OUTPATIENT)
Age: 53
End: 2023-02-05

## 2023-04-19 DIAGNOSIS — Z12.31 ENCOUNTER FOR SCREENING MAMMOGRAM FOR BREAST CANCER: Primary | ICD-10-CM

## 2023-05-02 ENCOUNTER — HOSPITAL ENCOUNTER (OUTPATIENT)
Dept: MAMMOGRAPHY | Facility: CLINIC | Age: 53
Discharge: HOME | End: 2023-05-02
Attending: FAMILY MEDICINE | Admitting: FAMILY MEDICINE
Payer: OTHER GOVERNMENT

## 2023-05-02 DIAGNOSIS — Z12.31 VISIT FOR SCREENING MAMMOGRAM: ICD-10-CM

## 2023-05-02 PROCEDURE — 77067 SCR MAMMO BI INCL CAD: CPT

## 2023-07-03 ENCOUNTER — OFFICE VISIT (OUTPATIENT)
Dept: FAMILY MEDICINE | Facility: CLINIC | Age: 53
End: 2023-07-03
Payer: OTHER GOVERNMENT

## 2023-07-03 VITALS
TEMPERATURE: 98.6 F | WEIGHT: 240 LBS | OXYGEN SATURATION: 97 % | BODY MASS INDEX: 47.12 KG/M2 | RESPIRATION RATE: 14 BRPM | SYSTOLIC BLOOD PRESSURE: 153 MMHG | HEART RATE: 86 BPM | HEIGHT: 60 IN | DIASTOLIC BLOOD PRESSURE: 92 MMHG

## 2023-07-03 DIAGNOSIS — Z11.4 SCREENING FOR HIV (HUMAN IMMUNODEFICIENCY VIRUS): ICD-10-CM

## 2023-07-03 DIAGNOSIS — L91.8 SKIN TAG: Primary | ICD-10-CM

## 2023-07-03 DIAGNOSIS — Z11.59 NEED FOR HEPATITIS C SCREENING TEST: ICD-10-CM

## 2023-07-03 DIAGNOSIS — Z12.11 SCREEN FOR COLON CANCER: ICD-10-CM

## 2023-07-03 DIAGNOSIS — Z12.4 CERVICAL CANCER SCREENING: ICD-10-CM

## 2023-07-03 PROCEDURE — 11200 RMVL SKIN TAGS UP TO&INC 15: CPT | Performed by: FAMILY MEDICINE

## 2023-07-03 NOTE — PROGRESS NOTES
Assessment & Plan     (L91.8) Skin tag  (primary encounter diagnosis)  Comment: History of skin tags 22 skin tags removed as described below.  Plan: PRIMARY CARE FOLLOW-UP SCHEDULING             PLAN:  1.  Procedure as below skin tags removed, follow-up as needed for this  2.  Patient is due for a physical in 6 months         Zeferino Acosta MD  [facility]    Chari Nolan is a 53 year old, presenting for the following health issues:    Patient comes in for removal of skin tags.  I have removed a number of skin tags in the past back in 2018 she has quite a few skin tags in the neck area and some underneath the left eye.          Skin Tags (Neck, left side of face, left arm)         No data to display              History of Present Illness       Reason for visit:  Removal of several skin tags also ask about a possible med for anxiety with flying trip planned in July    She eats 2-3 servings of fruits and vegetables daily.She consumes 0 sweetened beverage(s) daily.She exercises with enough effort to increase her heart rate 9 or less minutes per day.  She exercises with enough effort to increase her heart rate 3 or less days per week.   She is taking medications regularly.               Review of Systems         Objective    There were no vitals taken for this visit.  There is no height or weight on file to calculate BMI.  Physical Exam   The patient has 21 skin tags in the neck area all of them were quite small she has several more underneath the left eye.    I cleansed the skin tags with topical alcohol I then used a derma blade with pickup I removed 21 skin tags in the neck and one underneath the left eye of note a few smaller skin tags were still under the left eye was not able to remove them.

## 2023-10-05 ENCOUNTER — TELEPHONE (OUTPATIENT)
Dept: CARDIOLOGY | Facility: CLINIC | Age: 53
End: 2023-10-05
Payer: OTHER GOVERNMENT

## 2023-10-05 DIAGNOSIS — I49.3 PVC (PREMATURE VENTRICULAR CONTRACTION): ICD-10-CM

## 2023-10-05 RX ORDER — METOPROLOL TARTRATE 50 MG
TABLET ORAL
Qty: 180 TABLET | Refills: 1 | Status: SHIPPED | OUTPATIENT
Start: 2023-10-05 | End: 2024-01-22

## 2023-10-05 NOTE — TELEPHONE ENCOUNTER
[facility] Call Center    Phone Message    May a detailed message be left on voicemail: yes     Reason for Call: Medication Refill Request    Has the patient contacted the pharmacy for the refill? Yes   Name of medication being requested: metoprolol tartrate (LOPRESSOR) 50 MG tablet   Provider who prescribed the medication: Tyrell   Pharmacy: [id number] [place], [place] - [address] [facility] [address] [facility] [address] [facility]    Date medication is needed: Patient is out. Patient is not schedule with provider.       Action Taken: Other: Cardiology     Travel Screening: Not Applicable    Thank you!  [facility]

## 2024-01-22 ENCOUNTER — OFFICE VISIT (OUTPATIENT)
Dept: CARDIOLOGY | Facility: CLINIC | Age: 54
End: 2024-01-22
Payer: OTHER GOVERNMENT

## 2024-01-22 VITALS
BODY MASS INDEX: 47.26 KG/M2 | DIASTOLIC BLOOD PRESSURE: 82 MMHG | SYSTOLIC BLOOD PRESSURE: 138 MMHG | OXYGEN SATURATION: 97 % | WEIGHT: 242 LBS | RESPIRATION RATE: 16 BRPM | HEART RATE: 87 BPM

## 2024-01-22 DIAGNOSIS — I49.3 PVC (PREMATURE VENTRICULAR CONTRACTION): ICD-10-CM

## 2024-01-22 DIAGNOSIS — E66.01 CLASS 3 SEVERE OBESITY DUE TO EXCESS CALORIES WITHOUT SERIOUS COMORBIDITY WITH BODY MASS INDEX (BMI) OF 45.0 TO 49.9 IN ADULT (H): ICD-10-CM

## 2024-01-22 DIAGNOSIS — E66.813 CLASS 3 SEVERE OBESITY DUE TO EXCESS CALORIES WITHOUT SERIOUS COMORBIDITY WITH BODY MASS INDEX (BMI) OF 45.0 TO 49.9 IN ADULT (H): ICD-10-CM

## 2024-01-22 DIAGNOSIS — Z91.89 AT RISK FOR CORONARY ARTERY DISEASE: Primary | ICD-10-CM

## 2024-01-22 PROCEDURE — 99214 OFFICE O/P EST MOD 30 MIN: CPT | Performed by: INTERNAL MEDICINE

## 2024-01-22 RX ORDER — METOPROLOL TARTRATE 50 MG
50 TABLET ORAL 2 TIMES DAILY
Qty: 180 TABLET | Refills: 3 | Status: SHIPPED | OUTPATIENT
Start: 2024-01-22

## 2024-01-22 NOTE — PATIENT INSTRUCTIONS
"It was a pleasure to meet with you today.      Below is a summary of your visit.   I have ordered a coronary calcium score to evaluate your risk of coronary artery disease. This will likely not be covered by your insurance and will cost around $100-150.  I encourage regular exercise and a heart healthy diet. Exercise should consist of a combination of aerobic exercise and strength training.   Follow up with me in 2 years or sooner if needed.    Consider the book \"Terrorist Attack Girl\" by Jun Tan    We will call you to inform you of your test or procedure results within 3 business days of the test being performed.  If you do not hear from our office with the test results within 1 week please do not hesitate to call asking for these results.     Please do not hesitate to call the [facility] with any questions or concerns at [phone]. You can also reach my nurse, Chelsie, at [phone].    Sincerely,       "

## 2024-01-22 NOTE — LETTER
1/22/2024    Zeferino Acosta MD  [address] [place] [address]    RE: An Carter       Dear Colleague,     I had the pleasure of seeing An Carter in the [facility].    [facility]   [address]  [place] [address]   [email or web address]   OFFICE: [phone]     CARDIOLOGY CLINIC NOTE     Thank you, Dr. Acosta, Zeferino SMALLWOOD, for asking the [facility] team to see Ms. 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  Hypertension - controlled.  Obesity with BMI 47 without comorbid condition  Cardiac risk - An is concerned about her cardiac risk, given her weight and family history of CAD. She does not have any symptoms of angina so a cardiac calcium score would be the best way to evaluate her risk.    Plan:  CT cardiac calcium score.   Continue metoprolol for her history of monomorphic VT.  Discussed lifestyle changes in an effort to lose weight  Follow up in 2 years.         History of Present Illness   Ms. An Carter is a 53 year old female who presents for follow-up of PVCs and paroxysmal nonsustained ventricular tachycardia.      has a history of palpitations related to PVCs and salvos of nonsustained ventricular tachycardia dating back to around 2011. She is treated with metoprolol titrate and her most recent Holter monitor from 2017 demonstrated near resolution of her ventricular ectopy.    An is feeling generally well today. She reports only very rare palpitations/skipped beats. Her uncle recently underwent CABG and she is concerned about whether she has CAD.    Other than noted above, Ms. Carter denies any chest pain/pressure/tightness, shortness of breath at rest or with exertion, light headedness/dizziness,  pre-syncope, syncope, lower extremity swelling, palpitations, paroxysmal nocturnal dyspnea (PND), or orthopnea.     Cardiac Problems and Cardiac Diagnostics     Most Recent Cardiac testing:    Holter monitor 7/26/17  Baseline electrocardiogram shows sinus rhythm with normal electrocardiographic intervals  The average heart rate is 74 bpm with heart rate ranging between  bpm  Quite rare ventricular ectopy seen; 6 single PVCs  Rare supraventricular ectopy.  The computer notes 66 PACs perhaps somewhat of an overcount.  A number of these are actually return beat  Or some sinus arrhythmia.  4 atrial couplets are seen but there is no atrial tachycardia, no atrial fibrillation     Symptoms of faint palpitation noted at 4:39 PM and again around 10:35 PM but did not clearly correlate to arrhythmias     Conclusion essentially normal Holter monitor.    Ventricular ectopy is nearly absent     ECHO (report reviewed):   TTE 5/5/16   Summary   Normal left ventricular size.   Mild concentric left ventricular hypertrophy.   Left ventricular ejection fraction is visually estimated to be 60%.   No regional wall motion abnormalities.   Mild mitral regurgitation.   Mild tricuspid regurgitation.     CT coronary angiogram 5/17/11  CONCLUSIONS:   1.  Normal right ventricular size and systolic function without areas of   wall motion abnormality or aneurysm. No CT evidence of arrhythmogenic   right ventricular dysplasia.   2.  Suboptimal study for looking at the coronary arteries. However, within   that limitation, the coronary arteries appear normal, and the calcium   score is zero.   3.  Please review radiology portion of incidental noncardiac findings.       Medications  Allergies   Current Outpatient Medications   Medication Sig Dispense Refill    metoprolol tartrate (LOPRESSOR) 50 MG tablet Take 1 tablet (50 mg) by mouth 2 times daily 180 tablet 3      Allergies   Allergen Reactions    Amoxicillin Nausea and Vomiting    Metoprolol  Other (See Comments)     Pt felt bad, bradycardic and sick with the long acting metoprolol.    Mold Other (See Comments)     Sinus infections, runny nose.  Sinus infections, runny nose.    Mold [Molds & Smuts] Other (See Comments)     Sinus infections, runny nose.    Erythromycin Nausea and Vomiting        Physical Examination Review of Systems   Vitals: /82 (BP Location: Right arm, Patient Position: Sitting, Cuff Size: Adult Large)   Pulse 87   Resp 16   Wt 109.8 kg (242 lb)   SpO2 97%   BMI 47.26 kg/m    BMI= Body mass index is 47.26 kg/m .  Wt Readings from Last 3 Encounters:   24 109.8 kg (242 lb)   23 108.9 kg (240 lb)   22 109.8 kg (242 lb)       General: pleasant female. Obese body habitus No acute distress.   HENT: external ears normal. Nares patent. Mucous membranes moist.  Eyes: perrla, extraocular muscles intact. No scleral icterus.   Neck: No JVD  Lungs: clear to auscultation  COR: regular rate and rhythm, No murmurs, rubs, or gallops  Abd: nondistended, soft  Extrem: No edema        Please refer above for cardiac ROS details.       Past History   Past Medical History: History reviewed. No pertinent past medical history.     Past Surgical History:   Past Surgical History:   Procedure Laterality Date     SECTION      Description:  Section;  Recorded: 2009;  Comments: failure to progress        Family History:   Family History   Problem Relation Age of Onset    Breast Cancer Maternal Grandmother 80.00        Social History:   Social History     Socioeconomic History    Marital status:      Spouse name: Not on file    Number of children: Not on file    Years of education: Not on file    Highest education level: Not on file   Occupational History    Not on file   Tobacco Use    Smoking status: Never     Passive exposure: Past    Smokeless tobacco: Never   Substance and Sexual Activity    Alcohol use: Not on file    Drug use: Never    Sexual activity:  Not on file   Other Topics Concern    Not on file   Social History Narrative    Diet-    Exercise-     Social Determinants of Health     Financial Resource Strain: Not on file   Food Insecurity: Not on file   Transportation Needs: Not on file   Physical Activity: Not on file   Stress: Not on file   Social Connections: Not on file   Interpersonal Safety: Not on file   Housing Stability: Not on file            Lab Results    Chemistry/lipid CBC Cardiac Enzymes/BNP/TSH/INR   Lab Results   Component Value Date    CHOL 169 10/31/2011    HDL 30 (L) 10/31/2011    TRIG 247 (H) 10/31/2011    BUN 13 10/29/2020     10/29/2020    CO2 24 10/29/2020    Lab Results   Component Value Date    WBC 8.5 10/29/2020    HGB 15.2 10/29/2020    HCT 47.0 10/29/2020    MCV 89 10/29/2020     10/29/2020    Lab Results   Component Value Date    TSH 1.09 10/29/2020                Thank you for allowing me to participate in the care of your patient.      Sincerely,     Reji Santillan MD     [facility]  cc:   No referring provider defined for this encounter.

## 2024-01-22 NOTE — PROGRESS NOTES
[facility]   [address]  [place] [address]   [email or web address]   OFFICE: [phone]     CARDIOLOGY CLINIC NOTE     Thank you, Zeferino Marc, for asking the [facility] team to see Ms. 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  Hypertension - controlled.  Obesity with BMI 47 without comorbid condition  Cardiac risk - An is concerned about her cardiac risk, given her weight and family history of CAD. She does not have any symptoms of angina so a cardiac calcium score would be the best way to evaluate her risk.    Plan:  CT cardiac calcium score.   Continue metoprolol for her history of monomorphic VT.  Discussed lifestyle changes in an effort to lose weight  Follow up in 2 years.         History of Present Illness   Ms. An Carter is a 53 year old female who presents for follow-up of PVCs and paroxysmal nonsustained ventricular tachycardia.      has a history of palpitations related to PVCs and salvos of nonsustained ventricular tachycardia dating back to around 2011. She is treated with metoprolol titrate and her most recent Holter monitor from 2017 demonstrated near resolution of her ventricular ectopy.    An is feeling generally well today. She reports only very rare palpitations/skipped beats. Her uncle recently underwent CABG and she is concerned about whether she has CAD.    Other than noted above, Ms. Carter denies any chest pain/pressure/tightness, shortness of breath at rest or with exertion, light headedness/dizziness, pre-syncope, syncope, lower extremity swelling, palpitations, paroxysmal nocturnal dyspnea (PND), or orthopnea.     Cardiac Problems and Cardiac Diagnostics     Most Recent Cardiac testing:    Holter monitor  7/26/17  Baseline electrocardiogram shows sinus rhythm with normal electrocardiographic intervals  The average heart rate is 74 bpm with heart rate ranging between  bpm  Quite rare ventricular ectopy seen; 6 single PVCs  Rare supraventricular ectopy.  The computer notes 66 PACs perhaps somewhat of an overcount.  A number of these are actually return beat  Or some sinus arrhythmia.  4 atrial couplets are seen but there is no atrial tachycardia, no atrial fibrillation     Symptoms of faint palpitation noted at 4:39 PM and again around 10:35 PM but did not clearly correlate to arrhythmias     Conclusion essentially normal Holter monitor.    Ventricular ectopy is nearly absent     ECHO (report reviewed):   TTE 5/5/16   Summary   Normal left ventricular size.   Mild concentric left ventricular hypertrophy.   Left ventricular ejection fraction is visually estimated to be 60%.   No regional wall motion abnormalities.   Mild mitral regurgitation.   Mild tricuspid regurgitation.     CT coronary angiogram 5/17/11  CONCLUSIONS:   1.  Normal right ventricular size and systolic function without areas of   wall motion abnormality or aneurysm. No CT evidence of arrhythmogenic   right ventricular dysplasia.   2.  Suboptimal study for looking at the coronary arteries. However, within   that limitation, the coronary arteries appear normal, and the calcium   score is zero.   3.  Please review radiology portion of incidental noncardiac findings.       Medications  Allergies   Current Outpatient Medications   Medication Sig Dispense Refill    metoprolol tartrate (LOPRESSOR) 50 MG tablet Take 1 tablet (50 mg) by mouth 2 times daily 180 tablet 3      Allergies   Allergen Reactions    Amoxicillin Nausea and Vomiting    Metoprolol Other (See Comments)     Pt felt bad, bradycardic and sick with the long acting metoprolol.    Mold Other (See Comments)     Sinus infections, runny nose.  Sinus infections, runny nose.    Mold [Molds &  Smuts] Other (See Comments)     Sinus infections, runny nose.    Erythromycin Nausea and Vomiting        Physical Examination Review of Systems   Vitals: /82 (BP Location: Right arm, Patient Position: Sitting, Cuff Size: Adult Large)   Pulse 87   Resp 16   Wt 109.8 kg (242 lb)   SpO2 97%   BMI 47.26 kg/m    BMI= Body mass index is 47.26 kg/m .  Wt Readings from Last 3 Encounters:   24 109.8 kg (242 lb)   23 108.9 kg (240 lb)   22 109.8 kg (242 lb)       General: pleasant female. Obese body habitus No acute distress.   HENT: external ears normal. Nares patent. Mucous membranes moist.  Eyes: perrla, extraocular muscles intact. No scleral icterus.   Neck: No JVD  Lungs: clear to auscultation  COR: regular rate and rhythm, No murmurs, rubs, or gallops  Abd: nondistended, soft  Extrem: No edema        Please refer above for cardiac ROS details.       Past History   Past Medical History: History reviewed. No pertinent past medical history.     Past Surgical History:   Past Surgical History:   Procedure Laterality Date     SECTION      Description:  Section;  Recorded: 2009;  Comments: failure to progress        Family History:   Family History   Problem Relation Age of Onset    Breast Cancer Maternal Grandmother 80.00        Social History:   Social History     Socioeconomic History    Marital status:      Spouse name: Not on file    Number of children: Not on file    Years of education: Not on file    Highest education level: Not on file   Occupational History    Not on file   Tobacco Use    Smoking status: Never     Passive exposure: Past    Smokeless tobacco: Never   Substance and Sexual Activity    Alcohol use: Not on file    Drug use: Never    Sexual activity: Not on file   Other Topics Concern    Not on file   Social History Narrative    Diet-    Exercise-     Social Determinants of Health     Financial Resource Strain: Not on file   Food Insecurity: Not on  file   Transportation Needs: Not on file   Physical Activity: Not on file   Stress: Not on file   Social Connections: Not on file   Interpersonal Safety: Not on file   Housing Stability: Not on file            Lab Results    Chemistry/lipid CBC Cardiac Enzymes/BNP/TSH/INR   Lab Results   Component Value Date    CHOL 169 10/31/2011    HDL 30 (L) 10/31/2011    TRIG 247 (H) 10/31/2011    BUN 13 10/29/2020     10/29/2020    CO2 24 10/29/2020    Lab Results   Component Value Date    WBC 8.5 10/29/2020    HGB 15.2 10/29/2020    HCT 47.0 10/29/2020    MCV 89 10/29/2020     10/29/2020    Lab Results   Component Value Date    TSH 1.09 10/29/2020

## 2024-02-16 ENCOUNTER — HOSPITAL ENCOUNTER (OUTPATIENT)
Dept: CT IMAGING | Facility: CLINIC | Age: 54
Discharge: HOME | End: 2024-02-16
Attending: INTERNAL MEDICINE | Admitting: INTERNAL MEDICINE
Payer: OTHER GOVERNMENT

## 2024-02-16 DIAGNOSIS — Z91.89 AT RISK FOR CORONARY ARTERY DISEASE: ICD-10-CM

## 2024-02-16 LAB
CV CALCIUM SCORE AGATSTON LM: 9
CV CALCIUM SCORING AGATSON LAD: 0
CV CALCIUM SCORING AGATSTON CX: 0
CV CALCIUM SCORING AGATSTON RCA: 0
CV CALCIUM SCORING AGATSTON TOTAL: 9

## 2024-02-16 PROCEDURE — 75571 CT HRT W/O DYE W/CA TEST: CPT

## 2024-02-16 PROCEDURE — 75571 CT HRT W/O DYE W/CA TEST: CPT | Mod: 26 | Performed by: INTERNAL MEDICINE

## 2024-02-17 PROBLEM — R93.1 ELEVATED CORONARY ARTERY CALCIUM SCORE: Status: ACTIVE | Noted: 2024-02-17

## 2024-02-19 DIAGNOSIS — R93.1 ELEVATED CORONARY ARTERY CALCIUM SCORE: Primary | ICD-10-CM

## 2024-03-03 ENCOUNTER — HEALTH MAINTENANCE LETTER (OUTPATIENT)
Age: 54
End: 2024-03-03

## 2025-02-17 DIAGNOSIS — I49.3 PVC (PREMATURE VENTRICULAR CONTRACTION): ICD-10-CM

## 2025-02-17 RX ORDER — METOPROLOL TARTRATE 50 MG
50 TABLET ORAL 2 TIMES DAILY
Qty: 180 TABLET | Refills: 2 | Status: SHIPPED | OUTPATIENT
Start: 2025-02-17

## 2025-03-15 ENCOUNTER — HEALTH MAINTENANCE LETTER (OUTPATIENT)
Age: 55
End: 2025-03-15

## 2025-07-20 ENCOUNTER — HEALTH MAINTENANCE LETTER (OUTPATIENT)
Age: 55
End: 2025-07-20